# Patient Record
Sex: FEMALE | Race: WHITE | Employment: UNEMPLOYED | URBAN - METROPOLITAN AREA
[De-identification: names, ages, dates, MRNs, and addresses within clinical notes are randomized per-mention and may not be internally consistent; named-entity substitution may affect disease eponyms.]

---

## 2018-04-07 ENCOUNTER — HOSPITAL ENCOUNTER (EMERGENCY)
Facility: CLINIC | Age: 68
Discharge: HOME | End: 2018-04-08
Attending: EMERGENCY MEDICINE | Admitting: EMERGENCY MEDICINE
Payer: COMMERCIAL

## 2018-04-07 DIAGNOSIS — S00.83XA CONTUSION OF FACE, INITIAL ENCOUNTER: ICD-10-CM

## 2018-04-07 DIAGNOSIS — F10.920 ALCOHOLIC INTOXICATION WITHOUT COMPLICATION (H): ICD-10-CM

## 2018-04-07 PROCEDURE — 99285 EMERGENCY DEPT VISIT HI MDM: CPT

## 2018-04-07 ASSESSMENT — ENCOUNTER SYMPTOMS: COLOR CHANGE: 1

## 2018-04-07 NOTE — ED NOTES
Patient was found wondering around in [place] trying to flag down motorists. A  stopped to help her and patient was found to be intoxicated and unable to care for herself. She was placed on a  hold.

## 2018-04-07 NOTE — ED AVS SNAPSHOT
Emergency Department    [address] [facility] [address]    Phone:  [phone]    Fax:  [phone]                                       La Forrest   MRN: [id number]    Department:   Emergency Department   Date of Visit:  4/7/2018           Patient Information     Date Of Birth          1950        Your diagnoses for this visit were:     Contusion of face, initial encounter     Alcoholic intoxication without complication (H)        You were seen by Santiago Hart MD, Cherrie Clement MD, Hilda Comer MD, and Sanchez Harper MD.      Follow-up Information     Schedule an appointment as soon as possible for a visit with [facility], [facility].    Contact information:    [address]  [address]  [place] [address] [phone]          Follow up with  Emergency Department.    Specialty:  EMERGENCY MEDICINE    Why:  As needed, If symptoms worsen    Contact information:    [address] [address] [address] [phone]        Discharge Instructions         Alcohol Intoxication  Alcohol intoxication occurs when you drink alcohol faster than your liver can remove it from your system. The following facts are important to remember:    It can take 10 minutes or more to start to feel the effects of a drink, so you can easily get more intoxicated than you intended.    One drink may be more than 1 serving of alcohol. Depending on the drink, it can be 2 to 4 servings.    It takes about an hour for your body to metabolize (clear) 1 serving. If you have more than 1 drink, it can take a couple of hours or more.    Many things affect how drinks will affect you, including whether you ve eaten, how fast you drink, your size, how much you normally drink (or not), medicines you take, chronic diseases you have, and gender.  Signs and symptoms of alcohol poisoning  The following are signs and symptoms of alcohol  "poisoning:  Mild impairment    Reduced inhibitions    Slurred speech    Drowsiness    Decreased fine motor skills  Moderate impairment    Erratic behavior, aggression, depression    Impaired judgment    Confusion    Concentration difficulties    Coordination problems  Severe impairment    Vomiting    Seizures    Unconsciousness    Cold, clammy    Slow or irregular breathing    Hypothermia (low body temperature)    Coma  Health effects  Alcohol abuse causes health problems. Sometimes this can happen after only drinking a  little.\" There is no set number of drinks or amount of alcohol that defines too much. The more you drink at one time, and the more frequently you drink determine both the short-term and long-term health effects. It affects all parts of your body and your health, including your:    Brain. Alcohol is a central nervous system depressant. It can damage parts of the brain that affect your balance, memory, thinking, and emotions. It can cause memory loss, blackouts, depression, agitation, sleep cycle changes, and seizures. These changes may or may not be reversible.    Heart and vascular system. Alcohol affects multiple areas. It can damage heart muscle causing cardiomyopathy, which is a weakening and stretching of the heart muscle. This can lead to trouble breathing, an irregular heartbeat, atrial fibrillation, leg swelling, and heart failure. It makes the blood vessels stiffen causing hypertension (high blood pressure). All of these problems increase your risk of having heart attacks or strokes.    Liver. Alcohol causes fat to build up in the liver, affecting its normal function. This increases the risk for hepatitis, leading to abdominal pain, appetite loss, jaundice, bleeding problems, liver fibrosis, and cirrhosis. This in turn can affect your ability to fight off infections, and can cause diabetes. The liver changes prevent it from removing toxins in your blood that can cause encephalopathy. Signs " of this are confusion, altered level of consciousness, personality changes, memory loss, seizures, coma, and death.    Pancreas. Alcohol can cause inflammation of the pancreas, or pancreatitis. This can cause pain in your abdomen, fever, and diabetes.    Immune system. Alcohol weakens your immune system in a number of ways. It suppresses your immune system making it harder to fight off infections and colds. You will also have a higher risk of certain infections like pneumonia and tuberculosis.    Cancer risk. Alcohol raises your risk of cancer of the mouth, esophagus, pharynx, larynx, liver, and breast.    Sexual function. Alcohol abuse can also lead to sexual problems.  Alcohol use during pregnancy may cause permanent damage to the growing baby.  Home care  The following guidelines will help you care for yourself at home:    Don't drink any more alcohol.    Don't drive until all effects of the alcohol have worn off.    Don't operate machinery that can cause injuries.    Get lots of rest over the next few days. Drink plenty of water and other non-alcoholic liquids. Try to eat regular meals.    If you have been drinking heavily on a daily basis, you may go through alcohol withdrawal. The usual symptoms last 3 to 4 days and may include nervousness, shakiness, nausea, sweating, sleeplessness, and can even cause seizures and a serious withdrawal symptom called delirium tremens, or DTs. During this time, it is best that you stay with family or friends who can help and support you. You can also admit yourself to a residential detox program. If your symptoms are severe (seizures, severe shakiness, confusion), contact your doctor or call an ambulance for help (see below).   Follow-up care  If alcohol is a problem in your life, these are some organizations that can help you:    Alcoholics Anonymous offers support through a self-help fellowship. There are no dues or fees. See the Yellow Pages and call for time and place of  "meetings. Find AA online at www.aa.org.    [employer] offers support to families of alcohol users. Contact 719-515-9249, or online at www.al-anon.org.    National [place] on Alcoholism and Drug Dependence can be reached at 703-790-8281, or online at www.ncadd.org.    There are also inpatient and residential alcohol detox programs. Check the Internet or phonebook Yellow Pages under  Drug Abuse and Treatment Centers.   Call 911  Call 911 if any of these occur:    Trouble breathing or slow irregular breathing    Chest pain    Sudden weakness on one side of your body or sudden trouble speaking    Heavy bleeding or vomiting blood    Very drowsy or trouble awakening    Fainting or loss of consciousness    Rapid heart rate    Seizure  When to seek medical advice  Call your healthcare provider right away if any of these occur:    Severe shakiness     Fever over 100.4  F (38.0  C)    Confusion or hallucinations (seeing, hearing, or feeling things that are not there)    Pain in your upper abdomen that gets worse    Repeated vomiting  Date Last Reviewed: 6/1/2016 2000-2017 [employer]. [address] [place] [address]. All rights reserved. This information is not intended as a substitute for professional medical care. Always follow your healthcare professional's instructions.        Addiction: Getting Help     \"It was hard to admit that I had a problem. But when I did, I had to get help.\"   Admitting that you have a substance abuse problem isn t easy. It takes courage and honesty to admit you re abusing alcohol or other drugs. But once you re ready to look at your use, you ve taken a big step toward overcoming the problem. When you face your problem, you also accept that you re accountable for your actions and for changing them. There are many programs and people who can help you overcome your problem. And remember, it s OK to get help. It s also the first step to getting your life back " together.  Getting help and support  Recovery doesn t happen overnight. It s a lifelong process with many steps along the way. During those steps, you ll work on changing the things that were part of your substance abuse. A counselor or other health care provider can give you support. So can a , , or rabbi who is trained in substance abuse counseling. Friends and family may also help once you are connected with professionals. Together you can decide on lifestyle changes necessary to success, enabling you to have a positive and rewarding life.  Date Last Reviewed: 2/1/2017 2000-2017 [employer] [address] [place] [address]. All rights reserved. This information is not intended as a substitute for professional medical care. Always follow your healthcare professional's instructions.          24 Hour Appointment Hotline       To make an appointment at any [facility], call [phone] ([phone]). If you don't have a family doctor or clinic, we will help you find one. [facility] are conveniently located to serve the needs of you and your family.             Review of your medicines      Notice     You have not been prescribed any medications.            Procedures and tests performed during your visit     May feed patient      Orders Needing Specimen Collection     None      Pending Results     No orders found for last 3 day(s).            Pending Culture Results     No orders found for last 3 day(s).            Pending Results Instructions     If you had any lab results that were not finalized at the time of your Discharge, you can call the ED Lab Result RN at [phone]. You will be contacted by this team for any positive Lab results or changes in treatment. The nurses are available 7 days a week from 10A to 6:30P.  You can leave a message 24 hours per day and they will return your call.        Test Results From Your Hospital Stay               Clinical  Quality Measure: Blood Pressure Screening     Your blood pressure was checked while you were in the emergency department today. The last reading we obtained was  BP: (!) 155/101 . Please read the guidelines below about what these numbers mean and what you should do about them.  If your systolic blood pressure (the top number) is less than 120 and your diastolic blood pressure (the bottom number) is less than 80, then your blood pressure is normal. There is nothing more that you need to do about it.  If your systolic blood pressure (the top number) is 120-139 or your diastolic blood pressure (the bottom number) is 80-89, your blood pressure may be higher than it should be. You should have your blood pressure rechecked within a year by a primary care provider.  If your systolic blood pressure (the top number) is 140 or greater or your diastolic blood pressure (the bottom number) is 90 or greater, you may have high blood pressure. High blood pressure is treatable, but if left untreated over time it can put you at risk for heart attack, stroke, or kidney failure. You should have your blood pressure rechecked by a primary care provider within the next 4 weeks.  If your provider in the emergency department today gave you specific instructions to follow-up with your doctor or provider even sooner than that, you should follow that instruction and not wait for up to 4 weeks for your follow-up visit.        Thank you for choosing [facility]       Thank you for choosing [facility] for your care. Our goal is always to provide you with excellent care. Hearing back from our patients is one way we can continue to improve our services. Please take a few minutes to complete the written survey that you may receive in the mail after you visit with us. Thank you!        [employer] Information     [facility] lets you send messages to your doctor, view your test results, renew your prescriptions, schedule appointments and more. To sign up, go to  [email or web address] . Click on \"Log in\" on the left side of the screen, which will take you to the Welcome page. Then click on \"Sign up Now\" on the right side of the page.     You will be asked to enter the access code listed below, as well as some personal information. Please follow the directions to create your username and password.     Your access code is: [email or web address]  Expires: 2018  9:51 AM     Your access code will  in 90 days. If you need help or a new code, please call your [facility] clinic or [phone].        Care EveryWhere ID     This is your Care EveryWhere ID. This could be used by other organizations to access your [facility] medical records  [id number]        Equal Access to Services     NEHAL FOUNTAIN : Gina Jenkins, allen foreman, madhavi delaney, geeta gonzalez . So [facility] [phone].    ATENCIÓN: Si habla español, tiene a kelley disposición servicios gratuitos de asistencia lingüística. Llame al [phone].    We comply with applicable federal civil rights laws and [place] laws. We do not discriminate on the basis of race, color, national origin, age, disability, sex, sexual orientation, or gender identity.            After Visit Summary       This is your record. Keep this with you and show to your community pharmacist(s) and doctor(s) at your next visit.                  "

## 2018-04-07 NOTE — ED PROVIDER NOTES
"  History     Chief Complaint:  Alcohol abuse     HPI   La Forrest is a 67 year old female with history of alcohol abuse who presents with  for evaluation of alcohol intoxication. Per nursing report, the patient was found by the police wandering around [place] and was picked up, placed on  hold. Breathalyzer measurement was 0.25. Here, the patient is laying on the gurney there is a large black and blue bruise to her right eye. The patient admits to drinking today with her significant other, Rashid, today in their apartment. She states \"Don told me to leave.\" She is unsure how to get the bruise but states \"Don didn't do this.\" She is able to ambulate and denies any additional injuries. Per Care Everywhere, the patient has had multiple visits to [facility] for alcohol intoxication and has not been discharged to detox recently.     Allergies:  No known drug allergies     Medications:    Ibuprofen   Norco      Past Medical History:    Alcohol abuse  Homeless   Suicidal behavior  Subarachnoid hemorrhage    Past Surgical History:    History reviewed. No pertinent surgical history.    Family History:    History reviewed. No pertinent family history.      Social History:  Smoking status: Everyday smoker  Alcohol use: Yes   Marital Status:        Review of Systems   Skin: Positive for color change (right eye).   Psychiatric/Behavioral: Positive for behavioral problems.   All other systems reviewed and are negative.    Physical Exam   First Vitals:  Patient Vitals for the past 24 hrs:   BP Temp Temp src Pulse Heart Rate Resp SpO2 Height Weight   04/07/18 1732 125/79 98.9  F (37.2  C) Oral 77 77 16 99 % 1.651 m (5' 5\") 54.4 kg (120 lb)        Physical Exam  General: Resting comfortably on the gurney, initially difficult with the nurses and police   officer but quite content and pleasant during my interview   Head:  There is a slightly old a appearing right periorbital contusions noted "     There is bruising to eyebrow of bother eyelid and right infraorbital region     The underlying bones are non tender     There this no diplopia   Eyes:  The pupils are equal, round, and reactive to light    There is mild horizontal nystagmus     Extraocular muscles are intact    Conjunctivae and sclerae are normal  ENT:    The nose is normal    Pinnae are normal    The oropharynx is normal    Uvula is in the midline  Neck:  Normal range of motion    There is no rigidity noted    There is no midline cervical spine pain/tenderness    Trachea is in the midline    No mass is detected  CV:  Regular rate and underlying rhythm     Normal S1/S2, no S3/S4    No pathological murmur detected  Resp:  Lungs are clear    There is no tachypnea    Non-labored    No rales    No wheezing   GI:  Abdomen is soft, there is no rigidity    No distension    No tympani    No rebound tenderness     Non-surgical without peritoneal features  MS:  Normal muscular tone    Symmetric motor strength    No major joint effusions    No asymmetric leg swelling, no calf tenderness  Skin:  No rash or acute skin lesions noted  Neuro: Speech is normal and fluent    The patient is mildly intoxicated     She admits to drinking alcohol today   Psych:  Awake. Alert to birthday and address.     Normal affect.  Appropriate interactions.  Lymph: No anterior cervical lymphadenopathy noted    Emergency Department Course     Emergency Department Course:  Past medical records, nursing notes, and vitals reviewed.  1726: I performed an exam of the patient and obtained history, as documented above.   Patient had a normal adult meal here in the ED.     2100 hrs: I rechecked the patient and ask if she would like us to find a female detox bed, she noted yes.  Our staff is calling around but many of the units are at capacity        Impression & Plan    Medical Decision Making:  This patient presents with alcohol intoxication.  She was found walking around in [place]  and did have some bruising around her right eye which prompted the police to bring her here for an evaluation.  The bruising to the right forehead and periocular area appears somewhat old.  There is no bony tenderness underlying the ecchymosis.  The eye itself appears normal.  The patient does have alcohol intoxication.  She was intermittently difficult with the nurses, mainly wanting to be left alone.  We did offer the patient detox.  A female detox bed is being searched for, but most centers are at capacity at this time.  She does not have a ride home at this juncture.  We are still waiting for the status at [facility] to see if they have a bed.  Patient will be signed out to Dr. Clement for final disposition.  If there are no detox beds available, the patient can be discharged when clinically sober.     Diagnosis:    ICD-10-CM    1. Contusion of face, initial encounter S00.83XA    2. Alcoholic intoxication without complication (H) F10.920        Disposition:  Pending at this time.        EMERGENCY DEPARTMENT    MARLENE, Olaf Hunter, am serving as a scribe at 5:37 PM on 4/7/2018 to document services personally performed by Santiago Hart MD based on my observations and the provider's statements to me.       Santiago Hart MD  04/08/18 2025

## 2018-04-07 NOTE — ED AVS SNAPSHOT
Emergency Department    [address] [facility] [address]    Phone:  [phone]    Fax:  [phone]                                       La Forrest   MRN: [id number]    Department:   Emergency Department   Date of Visit:  4/7/2018           After Visit Summary Signature Page     I have received my discharge instructions, and my questions have been answered. I have discussed any challenges I see with this plan with the nurse or doctor.    ..........................................................................................................................................  Patient/Patient Representative Signature      ..........................................................................................................................................  Patient Representative Print Name and Relationship to Patient    ..................................................               ................................................  Date                                            Time    ..........................................................................................................................................  Reviewed by Signature/Title    ...................................................              ..............................................  Date                                                            Time

## 2018-04-07 NOTE — ED NOTES
Pt refused to go into  room. Informed by security she could sit in common area. Pt declined offer of blanket and updated on wait time for her food.

## 2018-04-07 NOTE — ED NOTES
"Report received. Assumed care of pt. Pt seen by Dr. Hart. Per Dr. Hart pt is to be given food to eat. Pt became verbally aggressive and beligerent when asked to change into scrubs. The presence of 2 RN's and 2 security guards needed to insist pt to change into scrubs which she reluctantly did after swearing and stating things like \"you better get the fuck away from me because I'm not in the mood\". Pt will be moved to the  area due to verbal aggression and being uncooperative.  "

## 2018-04-08 VITALS
TEMPERATURE: 98.9 F | HEART RATE: 77 BPM | DIASTOLIC BLOOD PRESSURE: 98 MMHG | BODY MASS INDEX: 19.99 KG/M2 | WEIGHT: 120 LBS | OXYGEN SATURATION: 94 % | RESPIRATION RATE: 16 BRPM | SYSTOLIC BLOOD PRESSURE: 148 MMHG | HEIGHT: 65 IN

## 2018-04-08 PROCEDURE — 25000132 ZZH RX MED GY IP 250 OP 250 PS 637: Performed by: EMERGENCY MEDICINE

## 2018-04-08 RX ORDER — LORAZEPAM 1 MG/1
1 TABLET ORAL ONCE
Status: COMPLETED | OUTPATIENT
Start: 2018-04-08 | End: 2018-04-08

## 2018-04-08 RX ADMIN — LORAZEPAM 1 MG: 1 TABLET ORAL at 08:42

## 2018-04-08 NOTE — ED NOTES
Report received. Assumed care of pt. Pt previously on  Hold with security present and will remain on that while in the ED. Belongings done by previous staff.

## 2018-04-08 NOTE — ED NOTES
Assumed care at this time. Report received from Dinorah NÚÑEZ RN.  Pt sleeping at this time. Will continue to monitor.    Plan per MD: Pt to be offered Detox; or Sober ride;  Marlen Soliman RN,.......................................... 4/7/2018   7:18 PM

## 2018-04-08 NOTE — ED NOTES
"Pt states she doesn't want to go to detox. Told pt we could either give her bus tokens to go somewhere during the day and phone numbers to shelters for the night. Pt refused the shelter numbers and states she would go to [place] where she \"knows some people\" and could stay at a place called \"[facility] where she can drink coffee during the day. Pt declined offer of help for her drinking.  "

## 2018-04-08 NOTE — ED NOTES
MD in to speak with pt.  Per MD pt is willing to go to detox. Will attempt to call detox facilities for available beds.  Marlen Soliman RN,.......................................... 4/7/2018   8:55 PM

## 2018-04-08 NOTE — ED NOTES
Pt signed D/C instructions but then refused to take the paperwork with shelter resources with her. Pt getting dressed.

## 2018-04-08 NOTE — ED PROVIDER NOTES
I assumed care of the patient at 7 AM from Dr. Comer.  Upon my face-to-face examination at 7:45 AM the patient is clinically sober and states she feels mildly anxious.  She received 1 mg of Ativan orally.  I discussed several treatment options with the patient including transfer to a detox facility or transfer to a crisis shelter as the patient relays that she has no place to go.  The patient declines both of these options and has become increasingly adversarial with the healthcare staff.  She received breakfast and at this point is clinically sober with no signs of significant alcohol withdrawal or delirium tremens.  She has repetitively refused any assistance in seeking treatment for her alcohol use and states that she will go to [facility] where she knows people.  We have advised her to seek treatment for her alcohol use and have provided a list of crisis shelters for assistance.  The patient was provided with bus tokens and discharged at her request.     Sanchez Harper MD  04/08/18 0969

## 2018-04-08 NOTE — DISCHARGE INSTRUCTIONS
"  Alcohol Intoxication  Alcohol intoxication occurs when you drink alcohol faster than your liver can remove it from your system. The following facts are important to remember:    It can take 10 minutes or more to start to feel the effects of a drink, so you can easily get more intoxicated than you intended.    One drink may be more than 1 serving of alcohol. Depending on the drink, it can be 2 to 4 servings.    It takes about an hour for your body to metabolize (clear) 1 serving. If you have more than 1 drink, it can take a couple of hours or more.    Many things affect how drinks will affect you, including whether you ve eaten, how fast you drink, your size, how much you normally drink (or not), medicines you take, chronic diseases you have, and gender.  Signs and symptoms of alcohol poisoning  The following are signs and symptoms of alcohol poisoning:  Mild impairment    Reduced inhibitions    Slurred speech    Drowsiness    Decreased fine motor skills  Moderate impairment    Erratic behavior, aggression, depression    Impaired judgment    Confusion    Concentration difficulties    Coordination problems  Severe impairment    Vomiting    Seizures    Unconsciousness    Cold, clammy    Slow or irregular breathing    Hypothermia (low body temperature)    Coma  Health effects  Alcohol abuse causes health problems. Sometimes this can happen after only drinking a  little.\" There is no set number of drinks or amount of alcohol that defines too much. The more you drink at one time, and the more frequently you drink determine both the short-term and long-term health effects. It affects all parts of your body and your health, including your:    Brain. Alcohol is a central nervous system depressant. It can damage parts of the brain that affect your balance, memory, thinking, and emotions. It can cause memory loss, blackouts, depression, agitation, sleep cycle changes, and seizures. These changes may or may not be " reversible.    Heart and vascular system. Alcohol affects multiple areas. It can damage heart muscle causing cardiomyopathy, which is a weakening and stretching of the heart muscle. This can lead to trouble breathing, an irregular heartbeat, atrial fibrillation, leg swelling, and heart failure. It makes the blood vessels stiffen causing hypertension (high blood pressure). All of these problems increase your risk of having heart attacks or strokes.    Liver. Alcohol causes fat to build up in the liver, affecting its normal function. This increases the risk for hepatitis, leading to abdominal pain, appetite loss, jaundice, bleeding problems, liver fibrosis, and cirrhosis. This in turn can affect your ability to fight off infections, and can cause diabetes. The liver changes prevent it from removing toxins in your blood that can cause encephalopathy. Signs of this are confusion, altered level of consciousness, personality changes, memory loss, seizures, coma, and death.    Pancreas. Alcohol can cause inflammation of the pancreas, or pancreatitis. This can cause pain in your abdomen, fever, and diabetes.    Immune system. Alcohol weakens your immune system in a number of ways. It suppresses your immune system making it harder to fight off infections and colds. You will also have a higher risk of certain infections like pneumonia and tuberculosis.    Cancer risk. Alcohol raises your risk of cancer of the mouth, esophagus, pharynx, larynx, liver, and breast.    Sexual function. Alcohol abuse can also lead to sexual problems.  Alcohol use during pregnancy may cause permanent damage to the growing baby.  Home care  The following guidelines will help you care for yourself at home:    Don't drink any more alcohol.    Don't drive until all effects of the alcohol have worn off.    Don't operate machinery that can cause injuries.    Get lots of rest over the next few days. Drink plenty of water and other non-alcoholic liquids.  Try to eat regular meals.    If you have been drinking heavily on a daily basis, you may go through alcohol withdrawal. The usual symptoms last 3 to 4 days and may include nervousness, shakiness, nausea, sweating, sleeplessness, and can even cause seizures and a serious withdrawal symptom called delirium tremens, or DTs. During this time, it is best that you stay with family or friends who can help and support you. You can also admit yourself to a residential detox program. If your symptoms are severe (seizures, severe shakiness, confusion), contact your doctor or call an ambulance for help (see below).   Follow-up care  If alcohol is a problem in your life, these are some organizations that can help you:    Alcoholics Anonymous offers support through a self-help fellowship. There are no dues or fees. See the Yellow Pages and call for time and place of meetings. Find AA online at www.aa.org.    [employer] offers support to families of alcohol users. Contact 011-714-1359, or online at www.al-anoace.org.    National [place] on Alcoholism and Drug Dependence can be reached at 168-132-8147, or online at www.ncadd.org.    There are also inpatient and residential alcohol detox programs. Check the Internet or phonebook Yellow Pages under  Drug Abuse and Treatment Centers.   Call 911  Call 911 if any of these occur:    Trouble breathing or slow irregular breathing    Chest pain    Sudden weakness on one side of your body or sudden trouble speaking    Heavy bleeding or vomiting blood    Very drowsy or trouble awakening    Fainting or loss of consciousness    Rapid heart rate    Seizure  When to seek medical advice  Call your healthcare provider right away if any of these occur:    Severe shakiness     Fever over 100.4  F (38.0  C)    Confusion or hallucinations (seeing, hearing, or feeling things that are not there)    Pain in your upper abdomen that gets worse    Repeated vomiting  Date Last Reviewed: 6/1/2016 2000-2017 The  "[employer]. [address] [place] [address]. All rights reserved. This information is not intended as a substitute for professional medical care. Always follow your healthcare professional's instructions.        Addiction: Getting Help     \"It was hard to admit that I had a problem. But when I did, I had to get help.\"   Admitting that you have a substance abuse problem isn t easy. It takes courage and honesty to admit you re abusing alcohol or other drugs. But once you re ready to look at your use, you ve taken a big step toward overcoming the problem. When you face your problem, you also accept that you re accountable for your actions and for changing them. There are many programs and people who can help you overcome your problem. And remember, it s OK to get help. It s also the first step to getting your life back together.  Getting help and support  Recovery doesn t happen overnight. It s a lifelong process with many steps along the way. During those steps, you ll work on changing the things that were part of your substance abuse. A counselor or other health care provider can give you support. So can a , , or rabbi who is trained in substance abuse counseling. Friends and family may also help once you are connected with professionals. Together you can decide on lifestyle changes necessary to success, enabling you to have a positive and rewarding life.  Date Last Reviewed: 2/1/2017 2000-2017 [employer]. [address] [place] [address]. All rights reserved. This information is not intended as a substitute for professional medical care. Always follow your healthcare professional's instructions.        "

## 2018-04-08 NOTE — ED NOTES
Pt given 2 bus tokens and EDT showed pt where hospital bus stop is. Offered D/C instructions again but pt refused to take them.

## 2018-04-08 NOTE — ED NOTES
Alcohol Breath test on scene at 1727 = 0.25.  Marlen Soliman RN,.......................................... 4/7/2018   7:25 PM
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
## 2018-04-08 NOTE — ED NOTES
Called [address], [facility], and [facility] to inquire if there are any detox beds available. All detox facilities at capacity at this time.  Waiting to hear back from [place].  Marlen Soliman RN,.......................................... 4/7/2018   9:00 PM

## 2019-01-19 ENCOUNTER — APPOINTMENT (OUTPATIENT)
Dept: GENERAL RADIOLOGY | Facility: CLINIC | Age: 69
End: 2019-01-19
Payer: MEDICARE

## 2019-01-19 ENCOUNTER — HOSPITAL ENCOUNTER (EMERGENCY)
Facility: CLINIC | Age: 69
Discharge: HOME | End: 2019-01-19
Attending: EMERGENCY MEDICINE | Admitting: EMERGENCY MEDICINE
Payer: MEDICARE

## 2019-01-19 VITALS
TEMPERATURE: 98 F | SYSTOLIC BLOOD PRESSURE: 124 MMHG | DIASTOLIC BLOOD PRESSURE: 71 MMHG | HEIGHT: 65 IN | WEIGHT: 120 LBS | HEART RATE: 74 BPM | RESPIRATION RATE: 19 BRPM | BODY MASS INDEX: 19.99 KG/M2 | OXYGEN SATURATION: 96 %

## 2019-01-19 DIAGNOSIS — F10.929 ALCOHOL INTOXICATION, WITH UNSPECIFIED COMPLICATION (H): ICD-10-CM

## 2019-01-19 DIAGNOSIS — Z59.00 HOMELESS: ICD-10-CM

## 2019-01-19 DIAGNOSIS — R06.00 DYSPNEA, UNSPECIFIED TYPE: ICD-10-CM

## 2019-01-19 DIAGNOSIS — Z87.09 HISTORY OF COPD: ICD-10-CM

## 2019-01-19 DIAGNOSIS — S09.90XA INJURY OF HEAD, INITIAL ENCOUNTER: ICD-10-CM

## 2019-01-19 LAB
ALBUMIN SERPL-MCNC: 3.2 G/DL (ref 3.4–5)
ALCOHOL BREATH TEST: 0.05 (ref 0–0.01)
ALP SERPL-CCNC: 109 U/L (ref 40–150)
ALT SERPL W P-5'-P-CCNC: 38 U/L (ref 0–50)
ANION GAP SERPL CALCULATED.3IONS-SCNC: 10 MMOL/L (ref 3–14)
AST SERPL W P-5'-P-CCNC: 48 U/L (ref 0–45)
BILIRUB SERPL-MCNC: 0.3 MG/DL (ref 0.2–1.3)
BUN SERPL-MCNC: 10 MG/DL (ref 7–30)
CALCIUM SERPL-MCNC: 8 MG/DL (ref 8.5–10.1)
CHLORIDE SERPL-SCNC: 110 MMOL/L (ref 94–109)
CO2 SERPL-SCNC: 25 MMOL/L (ref 20–32)
CREAT SERPL-MCNC: 0.64 MG/DL (ref 0.52–1.04)
ETHANOL SERPL-MCNC: 0.23 G/DL
GFR SERPL CREATININE-BSD FRML MDRD: >90 ML/MIN/{1.73_M2}
GLUCOSE SERPL-MCNC: 100 MG/DL (ref 70–99)
INTERPRETATION ECG - MUSE: NORMAL
POTASSIUM SERPL-SCNC: 3.4 MMOL/L (ref 3.4–5.3)
PROT SERPL-MCNC: 7.1 G/DL (ref 6.8–8.8)
SODIUM SERPL-SCNC: 145 MMOL/L (ref 133–144)

## 2019-01-19 PROCEDURE — 93005 ELECTROCARDIOGRAM TRACING: CPT

## 2019-01-19 PROCEDURE — 80053 COMPREHEN METABOLIC PANEL: CPT | Performed by: EMERGENCY MEDICINE

## 2019-01-19 PROCEDURE — 99285 EMERGENCY DEPT VISIT HI MDM: CPT | Mod: 25

## 2019-01-19 PROCEDURE — 82075 ASSAY OF BREATH ETHANOL: CPT

## 2019-01-19 PROCEDURE — 71046 X-RAY EXAM CHEST 2 VIEWS: CPT

## 2019-01-19 PROCEDURE — 80320 DRUG SCREEN QUANTALCOHOLS: CPT | Performed by: EMERGENCY MEDICINE

## 2019-01-19 SDOH — ECONOMIC STABILITY - HOUSING INSECURITY: HOMELESSNESS UNSPECIFIED: Z59.00

## 2019-01-19 ASSESSMENT — MIFFLIN-ST. JEOR: SCORE: 1085.2

## 2019-01-19 NOTE — ED AVS SNAPSHOT
Emergency Department  [address] [facility] [address]  Phone:  [phone]  Fax:  [phone]                                    La Forrest   MRN: [id number]    Department:   Emergency Department   Date of Visit:  1/19/2019           After Visit Summary Signature Page    I have received my discharge instructions, and my questions have been answered. I have discussed any challenges I see with this plan with the nurse or doctor.    ..........................................................................................................................................  Patient/Patient Representative Signature      ..........................................................................................................................................  Patient Representative Print Name and Relationship to Patient    ..................................................               ................................................  Date                                   Time    ..........................................................................................................................................  Reviewed by Signature/Title    ...................................................              ..............................................  Date                                               Time          22EPIC Rev 08/18

## 2019-01-19 NOTE — ED NOTES
Bed: ED21  Expected date:   Expected time:   Means of arrival:   Comments:  533 68F SOB COPD, ETOH in 10 min
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
## 2019-01-20 ENCOUNTER — HOSPITAL ENCOUNTER (EMERGENCY)
Facility: CLINIC | Age: 69
Discharge: HOME | End: 2019-01-21
Attending: EMERGENCY MEDICINE | Admitting: EMERGENCY MEDICINE
Payer: MEDICARE

## 2019-01-20 DIAGNOSIS — J44.1 COPD EXACERBATION (H): ICD-10-CM

## 2019-01-20 PROCEDURE — 80048 BASIC METABOLIC PNL TOTAL CA: CPT | Performed by: EMERGENCY MEDICINE

## 2019-01-20 PROCEDURE — 93005 ELECTROCARDIOGRAM TRACING: CPT

## 2019-01-20 PROCEDURE — 85025 COMPLETE CBC W/AUTO DIFF WBC: CPT | Performed by: EMERGENCY MEDICINE

## 2019-01-20 PROCEDURE — 94640 AIRWAY INHALATION TREATMENT: CPT

## 2019-01-20 PROCEDURE — 84484 ASSAY OF TROPONIN QUANT: CPT | Performed by: EMERGENCY MEDICINE

## 2019-01-20 PROCEDURE — 99285 EMERGENCY DEPT VISIT HI MDM: CPT | Mod: 25

## 2019-01-20 NOTE — ED AVS SNAPSHOT
Emergency Department  [address] [facility] [address]  Phone:  [phone]  Fax:  [phone]                                    La Forrest   MRN: [id number]    Department:   Emergency Department   Date of Visit:  1/20/2019           After Visit Summary Signature Page    I have received my discharge instructions, and my questions have been answered. I have discussed any challenges I see with this plan with the nurse or doctor.    ..........................................................................................................................................  Patient/Patient Representative Signature      ..........................................................................................................................................  Patient Representative Print Name and Relationship to Patient    ..................................................               ................................................  Date                                   Time    ..........................................................................................................................................  Reviewed by Signature/Title    ...................................................              ..............................................  Date                                               Time          22EPIC Rev 08/18

## 2019-01-21 ENCOUNTER — APPOINTMENT (OUTPATIENT)
Dept: GENERAL RADIOLOGY | Facility: CLINIC | Age: 69
End: 2019-01-21
Payer: MEDICARE

## 2019-01-21 VITALS
WEIGHT: 110 LBS | TEMPERATURE: 97.7 F | DIASTOLIC BLOOD PRESSURE: 101 MMHG | OXYGEN SATURATION: 97 % | RESPIRATION RATE: 16 BRPM | HEIGHT: 65 IN | SYSTOLIC BLOOD PRESSURE: 152 MMHG | BODY MASS INDEX: 18.33 KG/M2

## 2019-01-21 LAB
ANION GAP SERPL CALCULATED.3IONS-SCNC: 9 MMOL/L (ref 3–14)
BASE EXCESS BLDV CALC-SCNC: 2.5 MMOL/L
BASOPHILS # BLD AUTO: 0 10E9/L (ref 0–0.2)
BASOPHILS NFR BLD AUTO: 0.1 %
BUN SERPL-MCNC: 8 MG/DL (ref 7–30)
CALCIUM SERPL-MCNC: 7.7 MG/DL (ref 8.5–10.1)
CHLORIDE SERPL-SCNC: 109 MMOL/L (ref 94–109)
CO2 SERPL-SCNC: 26 MMOL/L (ref 20–32)
CREAT SERPL-MCNC: 0.54 MG/DL (ref 0.52–1.04)
DIFFERENTIAL METHOD BLD: ABNORMAL
EOSINOPHIL # BLD AUTO: 0 10E9/L (ref 0–0.7)
EOSINOPHIL NFR BLD AUTO: 0.4 %
ERYTHROCYTE [DISTWIDTH] IN BLOOD BY AUTOMATED COUNT: 16.9 % (ref 10–15)
GFR SERPL CREATININE-BSD FRML MDRD: >90 ML/MIN/{1.73_M2}
GLUCOSE SERPL-MCNC: 86 MG/DL (ref 70–99)
HCO3 BLDV-SCNC: 28 MMOL/L (ref 21–28)
HCT VFR BLD AUTO: 35.3 % (ref 35–47)
HGB BLD-MCNC: 12 G/DL (ref 11.7–15.7)
IMM GRANULOCYTES # BLD: 0 10E9/L (ref 0–0.4)
IMM GRANULOCYTES NFR BLD: 0.2 %
INTERPRETATION ECG - MUSE: NORMAL
LYMPHOCYTES # BLD AUTO: 2.7 10E9/L (ref 0.8–5.3)
LYMPHOCYTES NFR BLD AUTO: 32.3 %
MCH RBC QN AUTO: 31.9 PG (ref 26.5–33)
MCHC RBC AUTO-ENTMCNC: 34 G/DL (ref 31.5–36.5)
MCV RBC AUTO: 94 FL (ref 78–100)
MONOCYTES # BLD AUTO: 0.5 10E9/L (ref 0–1.3)
MONOCYTES NFR BLD AUTO: 6 %
NEUTROPHILS # BLD AUTO: 5.1 10E9/L (ref 1.6–8.3)
NEUTROPHILS NFR BLD AUTO: 61 %
NRBC # BLD AUTO: 0 10*3/UL
NRBC BLD AUTO-RTO: 0 /100
OXYHGB MFR BLDV: 90 %
PCO2 BLDV: 45 MM HG (ref 40–50)
PH BLDV: 7.4 PH (ref 7.32–7.43)
PLATELET # BLD AUTO: 268 10E9/L (ref 150–450)
PO2 BLDV: 73 MM HG (ref 25–47)
POTASSIUM SERPL-SCNC: 3.4 MMOL/L (ref 3.4–5.3)
RBC # BLD AUTO: 3.76 10E12/L (ref 3.8–5.2)
SODIUM SERPL-SCNC: 144 MMOL/L (ref 133–144)
TROPONIN I SERPL-MCNC: <0.015 UG/L (ref 0–0.04)
WBC # BLD AUTO: 8.3 10E9/L (ref 4–11)

## 2019-01-21 PROCEDURE — 82805 BLOOD GASES W/O2 SATURATION: CPT | Performed by: EMERGENCY MEDICINE

## 2019-01-21 PROCEDURE — 71046 X-RAY EXAM CHEST 2 VIEWS: CPT

## 2019-01-21 PROCEDURE — 25000125 ZZHC RX 250: Performed by: EMERGENCY MEDICINE

## 2019-01-21 RX ORDER — IPRATROPIUM BROMIDE AND ALBUTEROL SULFATE 2.5; .5 MG/3ML; MG/3ML
3 SOLUTION RESPIRATORY (INHALATION) ONCE
Status: COMPLETED | OUTPATIENT
Start: 2019-01-21 | End: 2019-01-21

## 2019-01-21 RX ORDER — DEXAMETHASONE SODIUM PHOSPHATE 10 MG/ML
10 INJECTION, SOLUTION INTRAMUSCULAR; INTRAVENOUS ONCE
Status: COMPLETED | OUTPATIENT
Start: 2019-01-21 | End: 2019-01-21

## 2019-01-21 RX ADMIN — IPRATROPIUM BROMIDE AND ALBUTEROL SULFATE 3 ML: .5; 2.5 SOLUTION RESPIRATORY (INHALATION) at 01:54

## 2019-01-21 RX ADMIN — DEXAMETHASONE SODIUM PHOSPHATE 10 MG: 10 INJECTION, SOLUTION INTRAMUSCULAR; INTRAVENOUS at 01:54

## 2019-01-21 ASSESSMENT — ENCOUNTER SYMPTOMS
VOMITING: 0
COUGH: 1
ABDOMINAL PAIN: 0
SHORTNESS OF BREATH: 1
FEVER: 0

## 2019-01-21 ASSESSMENT — MIFFLIN-ST. JEOR: SCORE: 1039.84
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
## 2019-01-29 ENCOUNTER — HOSPITAL ENCOUNTER (EMERGENCY)
Facility: CLINIC | Age: 69
Discharge: HOME | End: 2019-01-30
Attending: EMERGENCY MEDICINE | Admitting: EMERGENCY MEDICINE
Payer: MEDICARE

## 2019-01-29 ENCOUNTER — HOSPITAL ENCOUNTER (EMERGENCY)
Facility: CLINIC | Age: 69
Discharge: STILL ADMITTED OR ADMINISTRATIVE | End: 2019-01-29

## 2019-01-29 DIAGNOSIS — Z59.00 HOMELESSNESS: ICD-10-CM

## 2019-01-29 DIAGNOSIS — F10.920 ALCOHOLIC INTOXICATION WITHOUT COMPLICATION (H): ICD-10-CM

## 2019-01-29 LAB — GLUCOSE BLDC GLUCOMTR-MCNC: 82 MG/DL (ref 70–99)

## 2019-01-29 PROCEDURE — 99283 EMERGENCY DEPT VISIT LOW MDM: CPT

## 2019-01-29 PROCEDURE — 00000146 ZZHCL STATISTIC GLUCOSE BY METER IP

## 2019-01-29 SDOH — ECONOMIC STABILITY - HOUSING INSECURITY: HOMELESSNESS UNSPECIFIED: Z59.00

## 2019-01-29 NOTE — ED AVS SNAPSHOT
Emergency Department  [address] [facility] [address]  Phone:  [phone]  Fax:  [phone]                                    La Forrest   MRN: [id number]    Department:   Emergency Department   Date of Visit:  1/29/2019           After Visit Summary Signature Page    I have received my discharge instructions, and my questions have been answered. I have discussed any challenges I see with this plan with the nurse or doctor.    ..........................................................................................................................................  Patient/Patient Representative Signature      ..........................................................................................................................................  Patient Representative Print Name and Relationship to Patient    ..................................................               ................................................  Date                                   Time    ..........................................................................................................................................  Reviewed by Signature/Title    ...................................................              ..............................................  Date                                               Time          22EPIC Rev 08/18

## 2019-01-30 VITALS
OXYGEN SATURATION: 94 % | TEMPERATURE: 97.5 F | SYSTOLIC BLOOD PRESSURE: 132 MMHG | RESPIRATION RATE: 20 BRPM | DIASTOLIC BLOOD PRESSURE: 85 MMHG

## 2019-01-30 NOTE — ED NOTES
Called [facility] [address] [facility] on [address] [place] [address] in [place] and spoke to staff who confirmed that there is a female bed available for shelter tonight.   Marlen Soliman RN,.......................................... 1/29/2019   11:00 PM

## 2019-01-30 NOTE — ED PROVIDER NOTES
"  History     Chief Complaint:  Alcohol intoxication    HPI   The patients HPI is limited due to her altered mental status.  La Forrest is a 68 year old female with a history of frequent visits to the ED who presents with alcohol intoxication. The patient was intoxicated at [facility] at [facility] earlier today and EMS was called. The patient was taken to the ED for evaluation and treatment. Upon arrival, the patient vaguely states that she \"couldn't breath\" although no wheezing was noticed. She also states that she \"feels okay.\"  She has no other complaints.    Allergies:  The patient has no known drug allergies.    Medications:    Albuterol  Spiriva  Prednisone     Past Medical History:    chronic obstructive pulmonary disease  Alcohol abuse  emphysema    Past Surgical History:    The patient does not have any pertinent past surgical history.    Family History:    No past pertinent family history.    Social History:  Marital Status:     Smoker:   Current   Smokeless:   Unknown   Alcohol:   Yes   Drugs:   No   Lives at:   Homeless   Arrives with:   No one   Clinic:    Unknown   Work:   None      Review of Systems   Unable to perform ROS: Mental status change     Physical Exam     Patient Vitals for the past 24 hrs:   BP Temp Heart Rate Resp SpO2   01/30/19 0102 -- -- -- 20 --   01/29/19 2223 132/85 97.5  F (36.4  C) 89 -- 94 %     Physical Exam  General: Sleeping on the gurney, appears comfortable.  Well poorly groomed  Head:  The scalp, face, and head appear normal  Mouth/Throat: Mucus membranes are moist  CV:  Regular rate    Normal S1 and S2  No pathological murmur   Resp:  Breath sounds clear and equal bilaterally    Non-labored, no retractions or accessory muscle use    No coarseness    No wheezing   GI:  Abdomen is soft, no rigidity    No tenderness to palpation  MS:  No evidence of self injury, no lacerations.  No evidence of trauma.  Skin:  No lacerations  Neuro:  Speech is normal and " clear.     No apparent focal deficit.      Uses all extremities equally. Steady gate.  Psych:  Awake. Alert.  Normal affect, congruent with mood.      Appropriate interactions.    No Suicidal thoughts.    No thoughts of harming others.    Denies hallucinations, does not appear to be responding to internal stimuli.    Emergency Department Course   Laboratory:  Glucose by meter: 82     Emergency Department Course:  (7149) I performed an exam of the patient as documented above.      Work up completed. Results above.    (0120) I rechecked the patient and discussed the results of their workup thus far.      Findings and plan explained. Patient discharged home with instructions regarding supportive care, medications, and reasons to return. The importance of close follow-up was reviewed.     I personally reviewed the laboratory results with them and answered all related questions prior to discharge.    Impression & Plan    Medical Decision Making:  La Forrest is a 68 year old female who presents for evaluation of intoxication.  They are intoxicated here in ED by lab evaluation.   Patient has no known history of Delirium tremens or alcohol withdrawal seizures. There are no signs of co-ingestion including acetaminophen, drugs, medications, volatile alcohols.  There are no signs of trauma related to alcohol use and no further workup is needed including head CT.     Sober ride obtained.  Alcohol counseling provided by myself and patient does not want treatment resources.  DEC/ did not evaluate patient.      Diagnosis:    ICD-10-CM    1. Alcoholic intoxication without complication (H) F10.920    2. Homelessness Z59.0      Disposition:  Discharged    Scribe Disclosure:  I, Dipesh Yen, am serving as a scribe on 1/29/2019 at 10:43 PM to personally document services performed by Brittanie Walls MD based on my observations and the provider's statements to me.     Dipesh Yen  1/29/2019    EMERGENCY DEPARTMENT        Brittanie Walls MD  02/07/19 4097

## 2019-01-30 NOTE — DISCHARGE INSTRUCTIONS

## 2019-01-30 NOTE — ED NOTES
Called [facility]; [address] [place] [address] and On license of [facility] for availability of Male/ Female detox beds. All are currently at capacity. MD updated.  Marlen Soliman RN,.......................................... 1/29/2019   10:36 PM

## 2019-01-30 NOTE — ED NOTES
Attempted to obtain breathalyzer 3 times. Pt was unable to exhale hard enough to obtain a proper read.

## 2019-04-09 ENCOUNTER — HOSPITAL ENCOUNTER (EMERGENCY)
Facility: CLINIC | Age: 69
Discharge: HOME | End: 2019-04-09
Attending: EMERGENCY MEDICINE | Admitting: EMERGENCY MEDICINE
Payer: MEDICARE

## 2019-04-09 VITALS
SYSTOLIC BLOOD PRESSURE: 96 MMHG | TEMPERATURE: 96.4 F | OXYGEN SATURATION: 92 % | DIASTOLIC BLOOD PRESSURE: 62 MMHG | RESPIRATION RATE: 12 BRPM | HEART RATE: 87 BPM

## 2019-04-09 DIAGNOSIS — R06.00 DYSPNEA, UNSPECIFIED TYPE: ICD-10-CM

## 2019-04-09 LAB
ANION GAP SERPL CALCULATED.3IONS-SCNC: 8 MMOL/L (ref 3–14)
BASOPHILS # BLD AUTO: 0 10E9/L (ref 0–0.2)
BASOPHILS NFR BLD AUTO: 0 %
BUN SERPL-MCNC: 5 MG/DL (ref 7–30)
CALCIUM SERPL-MCNC: 7.9 MG/DL (ref 8.5–10.1)
CHLORIDE SERPL-SCNC: 108 MMOL/L (ref 94–109)
CO2 SERPL-SCNC: 29 MMOL/L (ref 20–32)
CREAT SERPL-MCNC: 0.56 MG/DL (ref 0.52–1.04)
DIFFERENTIAL METHOD BLD: ABNORMAL
EOSINOPHIL # BLD AUTO: 0 10E9/L (ref 0–0.7)
EOSINOPHIL NFR BLD AUTO: 0.5 %
ERYTHROCYTE [DISTWIDTH] IN BLOOD BY AUTOMATED COUNT: 17 % (ref 10–15)
GFR SERPL CREATININE-BSD FRML MDRD: >90 ML/MIN/{1.73_M2}
GLUCOSE SERPL-MCNC: 105 MG/DL (ref 70–99)
HCT VFR BLD AUTO: 34.1 % (ref 35–47)
HGB BLD-MCNC: 11.6 G/DL (ref 11.7–15.7)
IMM GRANULOCYTES # BLD: 0 10E9/L (ref 0–0.4)
IMM GRANULOCYTES NFR BLD: 0.2 %
INTERPRETATION ECG - MUSE: NORMAL
LYMPHOCYTES # BLD AUTO: 2.6 10E9/L (ref 0.8–5.3)
LYMPHOCYTES NFR BLD AUTO: 59.5 %
MCH RBC QN AUTO: 32.5 PG (ref 26.5–33)
MCHC RBC AUTO-ENTMCNC: 34 G/DL (ref 31.5–36.5)
MCV RBC AUTO: 96 FL (ref 78–100)
MONOCYTES # BLD AUTO: 0.6 10E9/L (ref 0–1.3)
MONOCYTES NFR BLD AUTO: 12.9 %
NEUTROPHILS # BLD AUTO: 1.2 10E9/L (ref 1.6–8.3)
NEUTROPHILS NFR BLD AUTO: 26.9 %
NRBC # BLD AUTO: 0 10*3/UL
NRBC BLD AUTO-RTO: 0 /100
NT-PROBNP SERPL-MCNC: 70 PG/ML (ref 0–900)
PLATELET # BLD AUTO: 165 10E9/L (ref 150–450)
POTASSIUM SERPL-SCNC: 3.3 MMOL/L (ref 3.4–5.3)
RBC # BLD AUTO: 3.57 10E12/L (ref 3.8–5.2)
SODIUM SERPL-SCNC: 145 MMOL/L (ref 133–144)
TROPONIN I SERPL-MCNC: <0.015 UG/L (ref 0–0.04)
WBC # BLD AUTO: 4.4 10E9/L (ref 4–11)

## 2019-04-09 PROCEDURE — 83880 ASSAY OF NATRIURETIC PEPTIDE: CPT | Performed by: EMERGENCY MEDICINE

## 2019-04-09 PROCEDURE — 99284 EMERGENCY DEPT VISIT MOD MDM: CPT | Performed by: EMERGENCY MEDICINE

## 2019-04-09 PROCEDURE — 93005 ELECTROCARDIOGRAM TRACING: CPT | Performed by: EMERGENCY MEDICINE

## 2019-04-09 PROCEDURE — 80048 BASIC METABOLIC PNL TOTAL CA: CPT | Performed by: EMERGENCY MEDICINE

## 2019-04-09 PROCEDURE — 99284 EMERGENCY DEPT VISIT MOD MDM: CPT | Mod: 25 | Performed by: EMERGENCY MEDICINE

## 2019-04-09 PROCEDURE — 93010 ELECTROCARDIOGRAM REPORT: CPT | Mod: Z6 | Performed by: EMERGENCY MEDICINE

## 2019-04-09 PROCEDURE — 84484 ASSAY OF TROPONIN QUANT: CPT | Performed by: EMERGENCY MEDICINE

## 2019-04-09 PROCEDURE — 85025 COMPLETE CBC W/AUTO DIFF WBC: CPT | Performed by: EMERGENCY MEDICINE

## 2019-04-09 ASSESSMENT — ENCOUNTER SYMPTOMS
NERVOUS/ANXIOUS: 1
SHORTNESS OF BREATH: 1
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
## 2019-04-09 NOTE — ED NOTES
At time of discharge, pt refused VS and acknowledgement of discharge. Pt did not sign discharge papers. Pt then up to BR.

## 2019-04-09 NOTE — ED AVS SNAPSHOT
[facility], [place], Emergency Department  [address]  [facility] [address]  Phone:  [phone]  Fax:  [phone]                                    La Forrest   MRN: [id number]    Department:  [facility], Emergency Department   Date of Visit:  4/9/2019           After Visit Summary Signature Page    I have received my discharge instructions, and my questions have been answered. I have discussed any challenges I see with this plan with the nurse or doctor.    ..........................................................................................................................................  Patient/Patient Representative Signature      ..........................................................................................................................................  Patient Representative Print Name and Relationship to Patient    ..................................................               ................................................  Date                                   Time    ..........................................................................................................................................  Reviewed by Signature/Title    ...................................................              ..............................................  Date                                               Time          22EPIC Rev 08/18

## 2019-04-09 NOTE — ED NOTES
Attempted to bring pt to x-ray. Pt refused. Pt sleepy.Dr. Knight notified. Pt also removed cardiac and sat monitor.
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
## 2019-04-09 NOTE — DISCHARGE INSTRUCTIONS
Please follow-up with your regular doctor in the next 2-3 days for further evaluation and follow-up care.  Please call to schedule an appointment.  Please continue your own medications.  Please return to the ER if you develop persistent high fever, worsening chest pain, shortness of breath, or any worsening of your current symptoms.  It was a pleasure taking care of you today.  We hope you feel better soon.

## 2019-05-20 ENCOUNTER — HOSPITAL ENCOUNTER (EMERGENCY)
Facility: CLINIC | Age: 69
Discharge: HOME | End: 2019-05-20
Attending: EMERGENCY MEDICINE | Admitting: EMERGENCY MEDICINE
Payer: MEDICARE

## 2019-05-20 VITALS
OXYGEN SATURATION: 98 % | HEIGHT: 61 IN | BODY MASS INDEX: 21.71 KG/M2 | DIASTOLIC BLOOD PRESSURE: 61 MMHG | HEART RATE: 89 BPM | TEMPERATURE: 97.6 F | RESPIRATION RATE: 16 BRPM | WEIGHT: 115 LBS | SYSTOLIC BLOOD PRESSURE: 101 MMHG

## 2019-05-20 DIAGNOSIS — J44.9 CHRONIC OBSTRUCTIVE PULMONARY DISEASE, UNSPECIFIED COPD TYPE (H): ICD-10-CM

## 2019-05-20 DIAGNOSIS — F10.920 ALCOHOLIC INTOXICATION WITHOUT COMPLICATION (H): ICD-10-CM

## 2019-05-20 PROCEDURE — 99285 EMERGENCY DEPT VISIT HI MDM: CPT

## 2019-05-20 RX ORDER — ALBUTEROL SULFATE 90 UG/1
2 AEROSOL, METERED RESPIRATORY (INHALATION) EVERY 4 HOURS PRN
Status: DISCONTINUED | OUTPATIENT
Start: 2019-05-20 | End: 2019-05-21 | Stop reason: HOSPADM

## 2019-05-20 ASSESSMENT — MIFFLIN-ST. JEOR: SCORE: 994.02

## 2019-05-20 ASSESSMENT — ENCOUNTER SYMPTOMS: SHORTNESS OF BREATH: 1

## 2019-05-20 NOTE — ED AVS SNAPSHOT
Emergency Department  [address] [facility] [address]  Phone:  [phone]  Fax:  [phone]                                    La Forrest   MRN: [id number]    Department:   Emergency Department   Date of Visit:  5/20/2019           After Visit Summary Signature Page    I have received my discharge instructions, and my questions have been answered. I have discussed any challenges I see with this plan with the nurse or doctor.    ..........................................................................................................................................  Patient/Patient Representative Signature      ..........................................................................................................................................  Patient Representative Print Name and Relationship to Patient    ..................................................               ................................................  Date                                   Time    ..........................................................................................................................................  Reviewed by Signature/Title    ...................................................              ..............................................  Date                                               Time          22EPIC Rev 08/18
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
## 2019-05-21 NOTE — ED PROVIDER NOTES
"  History     Chief Complaint:  Alcohol Intoxication & Shortness of Breath      HPI   History limited secondary to the patient's altered mental status. History supplemented by EMS report.    La Forrest is a 69 year old female with a history of alcohol abuse, COPD, hypertension, subarachnoid hemorrhage, hypernatremia, and drug seeking behavior who presents to the ED for evaluation of alcohol intoxication and shortness of breath. EMS reports that the patient called police tonight wanting to go to [facility]. Police told her that there were no female beds available in detox [facility] and so brought her to the ED for evaluation. Here the patient states, \"I have COPD and can't breathe.\" She denies that she has an inhaler or any other medications at home.     Of [facility] and states that her last drink was one hour prior to arrival. When asked where she is currently staying, the patient replies \"it doesn't matter,\" and she notes that she would like to go to detox [facility]. Per chart review, the patient has been seen in the ED multiple times in the setting of alcohol intoxication. She was just seen at the [facility] one week ago for COPD and alcohol intoxication, and five days ago she presented to the [place] emergency department for the same. The patient does have an ER Care Plan on file due to her multiple visits.    Allergies:  NKDA     Medications:    Albuterol  Spiriva  Klor-Con    Past Medical History:    COPD  Alcohol abuse  Centrilobular emphysema  Drug-seeking behavior  Acute respiratory failure  Hypertension   Subarachnoid hemorrhage  Homelessness  Suicidal ideation  Bilateral cataract    Past Surgical History:    The patient does not have any pertinent past surgical history.    Family History:    No past pertinent family history.    Social History:  Smoking Status: Former smoker  Alcohol Use: Yes - history of alcohol abuse and current " "use  Marital Status:   [4]  History of homelessness     Review of Systems   Unable to perform ROS: Mental status change   Respiratory: Positive for shortness of breath.      Physical Exam     Patient Vitals for the past 24 hrs:   BP Temp Temp src Resp SpO2 Height Weight   05/20/19 2215 99/62 97.6  F (36.4  C) Temporal 18 99 % 1.549 m (5' 1\") 52.2 kg (115 lb)        Physical Exam    General: Lying on her side, eating food  Eyes:  The pupils are equal and round    Conjunctivae and sclerae are normal  ENT:    No head trauma  Neck:  Normal range of motion  CV:  Regular rate and rhythm    Skin warm and well perfused   Resp:  Lungs are clear    Non-labored    No rales    No wheezing   MS:  Normal muscular tone  Skin:  No rash or acute skin lesions noted  Neuro:   Awake, alert.      Speech is normal and fluent.    Face is symmetric.     Moves all extremities equally  Psych: Normal affect.  Appropriate interactions.     Emergency Department Course     Interventions:  Medications   albuterol (PROAIR HFA/PROVENTIL HFA/VENTOLIN HFA) 108 (90 Base) MCG/ACT inhaler 2 puff (has no administration in time range)        Emergency Department Course:  Nursing notes and vitals reviewed. (2128) I performed an exam of the patient as documented above.     (2240) RN staff informed me that they had contacted [place] [id number] and [place], and no female detox beds were available.     (2245) I rechecked the patient and discussed the results of her workup thus far.     Findings and plan explained to the Patient. Patient discharged home with instructions regarding supportive care, medications, and reasons to return. The importance of close follow-up was reviewed.     I personally reviewed the physical exam results with the Patient and answered all related questions prior to discharge.     Impression & Plan      Medical Decision Making:  La Forrest is a 69 year old female with a history of chronic alcohol abuse who presents to " the emergency department for evaluation of alcohol intoxication.  Exam is consistent with isolated alcohol intoxication and the patient's mental status and ability to walk steadily improved with time. Unfortunately no detox beds were able to be secured tonight. After a period of close monitoring in the ED, I now consider the patient clinically sober such that discharge is safe. Patient denies any thoughts of self harm.  The patient declined my help with alcohol abuse resources.  She can return to the ED for acute problems. Has hx of COPD but no increase in work of breathing tonight with no hypoxia. Doubt acute exacerbation of COPD. Doubt pneumonia, PE, ACS, etc given her presentation. Has similar presentations in multiple different EDs recently.    Diagnosis:    ICD-10-CM    1. Alcoholic intoxication without complication (H) F10.920    2. Chronic obstructive pulmonary disease, unspecified COPD type (H) J44.9        Disposition:  discharged to home    Discharge Medications:     Medication List      There are no discharge medications for this visit.         Scribe Disclosure:  I, Alysha Helton, am serving as a scribe on 5/20/2019 at 10:47 PM to personally document services performed by Jade Torres MD based on my observations and the provider's statements to me.      Alysha Helton  5/20/2019    EMERGENCY DEPARTMENT       Jade Torres MD  05/20/19 9986

## 2019-05-21 NOTE — DISCHARGE INSTRUCTIONS
Use inhaler as needed    Discharge Instructions  Alcohol Intoxication    You have been seen today with alcohol intoxication. This means that you have enough alcohol in your system to impair your ability to mentally and physically function, perhaps to the extent that you were unable to care for yourself.    Generally, every Emergency Department visit should have a follow-up clinic visit with either a primary or a specialty clinic/provider. Please follow-up as instructed by your emergency provider today.    You may have come to the Emergency Department because of your intoxication, or for another reason, such as because of an injury. No matter what the case is, this visit is a ?red flag? regarding alcohol use, and you should consider whether your drinking pattern is a problem for you.     You may be at risk for alcohol-related problems if:    Men: you drink more than 14 drinks per week, or more than 4 drinks per occasion.    Women: you drink more than 7 drinks per week or more than 3 drinks per occasion.    You have black-outs.  You do things you regret while drinking.  You have legal problems because of drinking.  You have job problems because of drinking (you call in sick to work because of drinking).    CAGE Questions  Have you ever felt you should cut down on your drinking?  Have people annoyed you by criticizing your drinking?  Have you ever felt bad or guilty about your drinking?  Have you ever had a drink first thing in the morning to steady your nerves or get rid of a hangover (eye opener)?    If you answer yes to any of the CAGE questions, you may have a problem with alcohol.      Return to the Emergency Department if:  You become shaky or tremble when you try to stop drinking.   You have severe abdominal pain (belly pain).   You have a seizure or pass out.    You vomit (throw up) blood or have blood in your stool. This may be bright red or it may look like black coffee grounds.  You become lightheaded or  faint.      For further help, contact:   Your caregiver.    Alcoholics Anonymous (AA).    [facility]: [phone]  [facility] Central Office: [phone]   A drug or alcohol rehabilitation program.    You can get information on alcohol resources and groups by calling the number 211 or 1-431.613.4482 on any phone.     Seek medical care if:  You have persistent vomiting.   You have persistent pain in any part of your body.    You do not feel better after a few days.    If you were given a prescription for medicine here today, be sure to read all of the information (including the package insert) that comes with your prescription.  This will include important information about the medicine, its side effects, and any warnings that you need to know about.  The pharmacist who fills the prescription can provide more information and answer questions you may have about the medicine.  If you have questions or concerns that the pharmacist cannot address, please call or return to the Emergency Department.   Remember that you can always come back to the Emergency Department if you are not able to see your regular doctor in the amount of time listed above, if you get any new symptoms, or if there is anything that worries you.

## 2019-06-02 ENCOUNTER — HOSPITAL ENCOUNTER (EMERGENCY)
Facility: CLINIC | Age: 69
Discharge: HOME | End: 2019-06-02
Attending: EMERGENCY MEDICINE | Admitting: EMERGENCY MEDICINE
Payer: MEDICARE

## 2019-06-02 VITALS
RESPIRATION RATE: 15 BRPM | OXYGEN SATURATION: 92 % | SYSTOLIC BLOOD PRESSURE: 101 MMHG | DIASTOLIC BLOOD PRESSURE: 65 MMHG | TEMPERATURE: 98.3 F | HEIGHT: 65 IN | WEIGHT: 110 LBS | BODY MASS INDEX: 18.33 KG/M2

## 2019-06-02 DIAGNOSIS — F10.920 ALCOHOLIC INTOXICATION WITHOUT COMPLICATION (H): ICD-10-CM

## 2019-06-02 LAB — ALCOHOL BREATH TEST: 0.13 (ref 0–0.01)

## 2019-06-02 PROCEDURE — 25000132 ZZH RX MED GY IP 250 OP 250 PS 637: Mod: GY | Performed by: EMERGENCY MEDICINE

## 2019-06-02 PROCEDURE — 82075 ASSAY OF BREATH ETHANOL: CPT

## 2019-06-02 PROCEDURE — 99285 EMERGENCY DEPT VISIT HI MDM: CPT

## 2019-06-02 PROCEDURE — A9270 NON-COVERED ITEM OR SERVICE: HCPCS | Mod: GY | Performed by: EMERGENCY MEDICINE

## 2019-06-02 RX ORDER — IPRATROPIUM BROMIDE AND ALBUTEROL SULFATE 2.5; .5 MG/3ML; MG/3ML
3 SOLUTION RESPIRATORY (INHALATION) ONCE
Status: DISCONTINUED | OUTPATIENT
Start: 2019-06-02 | End: 2019-06-03 | Stop reason: HOSPADM

## 2019-06-02 RX ORDER — ACETAMINOPHEN 325 MG/1
650 TABLET ORAL ONCE
Status: COMPLETED | OUTPATIENT
Start: 2019-06-02 | End: 2019-06-02

## 2019-06-02 RX ADMIN — ACETAMINOPHEN 650 MG: 325 TABLET, FILM COATED ORAL at 21:40

## 2019-06-02 ASSESSMENT — MIFFLIN-ST. JEOR: SCORE: 1034.84

## 2019-06-02 NOTE — ED AVS SNAPSHOT
Emergency Department  [address] [facility] [address]  Phone:  [phone]  Fax:  [phone]                                    La Forrest   MRN: [id number]    Department:   Emergency Department   Date of Visit:  6/2/2019           After Visit Summary Signature Page    I have received my discharge instructions, and my questions have been answered. I have discussed any challenges I see with this plan with the nurse or doctor.    ..........................................................................................................................................  Patient/Patient Representative Signature      ..........................................................................................................................................  Patient Representative Print Name and Relationship to Patient    ..................................................               ................................................  Date                                   Time    ..........................................................................................................................................  Reviewed by Signature/Title    ...................................................              ..............................................  Date                                               Time          22EPIC Rev 08/18

## 2019-06-03 NOTE — ED PROVIDER NOTES
"  History     Chief Complaint:  Alcohol Intoxication (Pt states she drank a pint of rum today.)      HPI   HPI is limited due to the patients mental status.  La Forrest is a 69 year old female who presents to the emergency department today for evaluation of alcohol intoxication, leg pain, and COPD complications. Police picked her up wandering in the streets flagging down cars. Patient notes she was \"trying to get to the hospital\". She notes she lives with a friend in [place], and would be willing to go to a detox center [facility].  She also notes slight knee pain because she fell a few months back. She states that her COPD is acting up and would like a breathing treatment.    Allergies:  No Known Drug Allergies      Medications:    No current outpatient medications on file.     Past Medical History:    COPD    Past Surgical History:    History reviewed. No pertinent surgical history.     Family History:    History reviewed. No pertinent family history.    Social History:  Smoking Status: current  Smokeless Tobacco: no  Alcohol Use: yes, binge   Marital Status:   [4]     Review of Systems   Unable to perform ROS: Mental status change       Physical Exam     Patient Vitals for the past 24 hrs:   BP Temp Temp src Heart Rate Resp SpO2 Height Weight   06/02/19 2321 101/65 -- -- 85 -- 92 % -- --   06/02/19 2114 -- 98.3  F (36.8  C) Oral -- -- -- -- --   06/02/19 2051 98/62 -- Oral 76 15 93 % 1.651 m (5' 5\") 49.9 kg (110 lb)        Physical Exam  VS: Reviewed per above  HENT: Mucous membranes moist  EYES: sclera anicteric  CV: Rate as noted, regular rhythm.   RESP: Effort normal. Breath sounds are normal bilaterally.  GI: no tenderness, not distended.  NEURO: Alert, moving all extremities equally  MSK: No deformity of the extremities  SKIN: Warm and dry    Emergency Department Course   Laboratory:  EtOH level: 0.113     Interventions:  Acetaminophen 650 mg PO.    Emergency Department Course:  Past " medical records, nursing notes, and vitals reviewed.  2055: I performed an exam of the patient and obtained history, as documented above.  2308: I rechecked the patient. Findings and plan explained to the Patient. Patient discharged home with instructions regarding supportive care, medications, and reasons to return. The importance of close follow-up was reviewed.         Impression & Plan      Medical Decision Making:  Patient presents to the ER for evaluation of alcohol intoxication.  Initial vital signs within normal limits.  Exam is unrevealing.  Lung sounds are clear.  Right knee does not have any effusion or redness or warmth to suggest occult infectious arthritis.  I offered a DuoNeb but patient declined it later in ER course.  Breath alcohol per EMS was 0.2.  Here it was 0.113.  After period of observation, patient tolerated p.o. and was deemed clinically sober.  She was discharged with recommendations to follow-up with the primary care setting.    Diagnosis:    ICD-10-CM    1. Alcoholic intoxication without complication (H) F10.920        Disposition:  discharged to home    Discharge Medications:     Medication List      There are no discharge medications for this visit.           Myrna Zamorano  6/2/2019    EMERGENCY DEPARTMENT  Scribe Disclosure:  I, Myrna Zamorano, am serving as a scribe at 2055 PM on 6/2/2019 to document services personally performed by Stanley Fishman MD based on my observations and the provider's statements to me.       Stanley Fishman MD  06/03/19 0150

## 2019-06-03 NOTE — ED NOTES
Video Observation initiated, patient informed. Pt changed into behavioral scrubs cooperatively, cameras turned off to maintain privacy.    Taty Flowers RN

## 2019-06-03 NOTE — DISCHARGE INSTRUCTIONS

## 2019-06-03 NOTE — ED TRIAGE NOTES
Pt brought in by PD found drunk and waving down cars in the road. Put on  hold. PD states she blew a 0.20

## 2019-06-03 NOTE — ED NOTES
Pt asked to change back into her clothing. Pt told she is discharged, became upset and threw belongings bag down angrily.

## 2019-06-03 NOTE — ED NOTES
Attempted 3 times for breathalyzer, pt's breaths were not strong enough to obtain a successful reading.

## 2020-01-01 ENCOUNTER — TRANSFERRED RECORDS (OUTPATIENT)
Dept: HEALTH INFORMATION MANAGEMENT | Facility: CLINIC | Age: 70
End: 2020-01-01

## 2020-01-01 ENCOUNTER — TRANSCRIBE ORDERS (OUTPATIENT)
Dept: OTHER | Age: 70
End: 2020-01-01

## 2020-01-01 ENCOUNTER — ONCOLOGY VISIT (OUTPATIENT)
Dept: ONCOLOGY | Facility: CLINIC | Age: 70
End: 2020-01-01
Attending: SURGERY
Payer: MEDICARE

## 2020-01-01 ENCOUNTER — PRE VISIT (OUTPATIENT)
Dept: ONCOLOGY | Facility: CLINIC | Age: 70
End: 2020-01-01

## 2020-01-01 ENCOUNTER — HOSPITAL ENCOUNTER (EMERGENCY)
Facility: CLINIC | Age: 70
Discharge: HOME | End: 2020-01-10
Attending: EMERGENCY MEDICINE | Admitting: EMERGENCY MEDICINE
Payer: MEDICARE

## 2020-01-01 ENCOUNTER — PATIENT OUTREACH (OUTPATIENT)
Dept: SURGERY | Facility: CLINIC | Age: 70
End: 2020-01-01

## 2020-01-01 ENCOUNTER — HOSPITAL ENCOUNTER (EMERGENCY)
Facility: CLINIC | Age: 70
Discharge: HOME | End: 2020-03-18
Attending: EMERGENCY MEDICINE | Admitting: EMERGENCY MEDICINE
Payer: MEDICARE

## 2020-01-01 ENCOUNTER — APPOINTMENT (OUTPATIENT)
Dept: GENERAL RADIOLOGY | Facility: CLINIC | Age: 70
End: 2020-01-01
Attending: EMERGENCY MEDICINE
Payer: MEDICARE

## 2020-01-01 ENCOUNTER — MEDICAL CORRESPONDENCE (OUTPATIENT)
Dept: HEALTH INFORMATION MANAGEMENT | Facility: CLINIC | Age: 70
End: 2020-01-01

## 2020-01-01 ENCOUNTER — HOSPITAL ENCOUNTER (EMERGENCY)
Facility: CLINIC | Age: 70
Discharge: HOME | End: 2020-01-08
Attending: EMERGENCY MEDICINE | Admitting: EMERGENCY MEDICINE
Payer: MEDICARE

## 2020-01-01 ENCOUNTER — TELEPHONE (OUTPATIENT)
Dept: ONCOLOGY | Facility: CLINIC | Age: 70
End: 2020-01-01

## 2020-01-01 VITALS
HEIGHT: 65 IN | SYSTOLIC BLOOD PRESSURE: 151 MMHG | RESPIRATION RATE: 28 BRPM | HEART RATE: 107 BPM | DIASTOLIC BLOOD PRESSURE: 137 MMHG | BODY MASS INDEX: 19.99 KG/M2 | OXYGEN SATURATION: 96 % | TEMPERATURE: 97.7 F | WEIGHT: 120 LBS

## 2020-01-01 VITALS
HEIGHT: 65 IN | WEIGHT: 119.3 LBS | HEART RATE: 89 BPM | RESPIRATION RATE: 18 BRPM | SYSTOLIC BLOOD PRESSURE: 120 MMHG | DIASTOLIC BLOOD PRESSURE: 68 MMHG | TEMPERATURE: 97.7 F | BODY MASS INDEX: 19.88 KG/M2 | OXYGEN SATURATION: 96 %

## 2020-01-01 VITALS
TEMPERATURE: 98 F | SYSTOLIC BLOOD PRESSURE: 125 MMHG | OXYGEN SATURATION: 93 % | DIASTOLIC BLOOD PRESSURE: 85 MMHG | RESPIRATION RATE: 16 BRPM | HEART RATE: 97 BPM

## 2020-01-01 VITALS
DIASTOLIC BLOOD PRESSURE: 87 MMHG | RESPIRATION RATE: 18 BRPM | SYSTOLIC BLOOD PRESSURE: 128 MMHG | OXYGEN SATURATION: 97 % | HEART RATE: 87 BPM

## 2020-01-01 DIAGNOSIS — F17.210 CIGARETTE SMOKER: ICD-10-CM

## 2020-01-01 DIAGNOSIS — F10.220 ALCOHOL DEPENDENCE WITH UNCOMPLICATED INTOXICATION (H): ICD-10-CM

## 2020-01-01 DIAGNOSIS — J44.1 COPD EXACERBATION (H): ICD-10-CM

## 2020-01-01 DIAGNOSIS — R41.82 ALTERED MENTAL STATUS, UNSPECIFIED ALTERED MENTAL STATUS TYPE: ICD-10-CM

## 2020-01-01 DIAGNOSIS — C80.1 SIGNET RING CELL CARCINOMA (H): Primary | ICD-10-CM

## 2020-01-01 DIAGNOSIS — F10.121 ALCOHOL INTOXICATION DELIRIUM (H): ICD-10-CM

## 2020-01-01 DIAGNOSIS — F10.920 ALCOHOLIC INTOXICATION WITHOUT COMPLICATION (H): ICD-10-CM

## 2020-01-01 DIAGNOSIS — C80.1 SIGNET RING CELL CARCINOMA (H): ICD-10-CM

## 2020-01-01 LAB
ALCOHOL BREATH TEST: 0.16 (ref 0–0.01)
ALCOHOL BREATH TEST: NORMAL (ref 0–0.01)
ANION GAP SERPL CALCULATED.3IONS-SCNC: 9 MMOL/L (ref 3–14)
BASE DEFICIT BLDV-SCNC: 0.9 MMOL/L
BASOPHILS # BLD AUTO: 0 10E9/L (ref 0–0.2)
BASOPHILS NFR BLD AUTO: 0 %
BUN SERPL-MCNC: 7 MG/DL (ref 7–30)
CALCIUM SERPL-MCNC: 8 MG/DL (ref 8.5–10.1)
CHLORIDE SERPL-SCNC: 112 MMOL/L (ref 94–109)
CO2 SERPL-SCNC: 25 MMOL/L (ref 20–32)
COPATH REPORT: NORMAL
COPATH REPORT: NORMAL
CREAT SERPL-MCNC: 0.55 MG/DL (ref 0.52–1.04)
DIFFERENTIAL METHOD BLD: ABNORMAL
EOSINOPHIL # BLD AUTO: 0 10E9/L (ref 0–0.7)
EOSINOPHIL NFR BLD AUTO: 0.5 %
ERYTHROCYTE [DISTWIDTH] IN BLOOD BY AUTOMATED COUNT: 18.1 % (ref 10–15)
ETHANOL SERPL-MCNC: 0.26 G/DL
GFR SERPL CREATININE-BSD FRML MDRD: >90 ML/MIN/{1.73_M2}
GLUCOSE SERPL-MCNC: 71 MG/DL (ref 70–99)
HCO3 BLDV-SCNC: 25 MMOL/L (ref 21–28)
HCT VFR BLD AUTO: 32 % (ref 35–47)
HGB BLD-MCNC: 10.6 G/DL (ref 11.7–15.7)
IMM GRANULOCYTES # BLD: 0 10E9/L (ref 0–0.4)
IMM GRANULOCYTES NFR BLD: 0.3 %
INTERPRETATION ECG - MUSE: NORMAL
LYMPHOCYTES # BLD AUTO: 2.6 10E9/L (ref 0.8–5.3)
LYMPHOCYTES NFR BLD AUTO: 43.6 %
MCH RBC QN AUTO: 29.9 PG (ref 26.5–33)
MCHC RBC AUTO-ENTMCNC: 33.1 G/DL (ref 31.5–36.5)
MCV RBC AUTO: 90 FL (ref 78–100)
MONOCYTES # BLD AUTO: 0.6 10E9/L (ref 0–1.3)
MONOCYTES NFR BLD AUTO: 10.5 %
NEUTROPHILS # BLD AUTO: 2.7 10E9/L (ref 1.6–8.3)
NEUTROPHILS NFR BLD AUTO: 45.1 %
NRBC # BLD AUTO: 0 10*3/UL
NRBC BLD AUTO-RTO: 0 /100
O2/TOTAL GAS SETTING VFR VENT: 21 %
PCO2 BLDV: 42 MM HG (ref 40–50)
PH BLDV: 7.37 PH (ref 7.32–7.43)
PLATELET # BLD AUTO: 344 10E9/L (ref 150–450)
PO2 BLDV: 37 MM HG (ref 25–47)
POTASSIUM SERPL-SCNC: 3.3 MMOL/L (ref 3.4–5.3)
RBC # BLD AUTO: 3.54 10E12/L (ref 3.8–5.2)
SODIUM SERPL-SCNC: 146 MMOL/L (ref 133–144)
TROPONIN I BLD-MCNC: 0 UG/L (ref 0–0.08)
TROPONIN I SERPL-MCNC: <0.015 UG/L (ref 0–0.04)
TROPONIN I SERPL-MCNC: <0.015 UG/L (ref 0–0.04)
WBC # BLD AUTO: 6 10E9/L (ref 4–11)

## 2020-01-01 PROCEDURE — 93005 ELECTROCARDIOGRAM TRACING: CPT | Performed by: EMERGENCY MEDICINE

## 2020-01-01 PROCEDURE — 93010 ELECTROCARDIOGRAM REPORT: CPT | Mod: Z6 | Performed by: EMERGENCY MEDICINE

## 2020-01-01 PROCEDURE — G0463 HOSPITAL OUTPT CLINIC VISIT: HCPCS | Mod: ZF

## 2020-01-01 PROCEDURE — A9270 NON-COVERED ITEM OR SERVICE: HCPCS | Mod: GY | Performed by: EMERGENCY MEDICINE

## 2020-01-01 PROCEDURE — 94640 AIRWAY INHALATION TREATMENT: CPT | Performed by: EMERGENCY MEDICINE

## 2020-01-01 PROCEDURE — 25000132 ZZH RX MED GY IP 250 OP 250 PS 637: Mod: GY | Performed by: EMERGENCY MEDICINE

## 2020-01-01 PROCEDURE — 80048 BASIC METABOLIC PNL TOTAL CA: CPT | Performed by: EMERGENCY MEDICINE

## 2020-01-01 PROCEDURE — 84484 ASSAY OF TROPONIN QUANT: CPT | Mod: 91

## 2020-01-01 PROCEDURE — 99285 EMERGENCY DEPT VISIT HI MDM: CPT | Mod: 25 | Performed by: EMERGENCY MEDICINE

## 2020-01-01 PROCEDURE — 99284 EMERGENCY DEPT VISIT MOD MDM: CPT | Mod: 25

## 2020-01-01 PROCEDURE — 99284 EMERGENCY DEPT VISIT MOD MDM: CPT | Mod: 25 | Performed by: EMERGENCY MEDICINE

## 2020-01-01 PROCEDURE — 84484 ASSAY OF TROPONIN QUANT: CPT | Performed by: EMERGENCY MEDICINE

## 2020-01-01 PROCEDURE — 82075 ASSAY OF BREATH ETHANOL: CPT

## 2020-01-01 PROCEDURE — 00000346 ZZHCL STATISTIC REVIEW OUTSIDE SLIDES TC 88321: Performed by: SURGERY

## 2020-01-01 PROCEDURE — 25000131 ZZH RX MED GY IP 250 OP 636 PS 637: Mod: GY | Performed by: EMERGENCY MEDICINE

## 2020-01-01 PROCEDURE — 25000125 ZZHC RX 250: Performed by: EMERGENCY MEDICINE

## 2020-01-01 PROCEDURE — 93005 ELECTROCARDIOGRAM TRACING: CPT

## 2020-01-01 PROCEDURE — 94640 AIRWAY INHALATION TREATMENT: CPT

## 2020-01-01 PROCEDURE — 85025 COMPLETE CBC W/AUTO DIFF WBC: CPT | Performed by: EMERGENCY MEDICINE

## 2020-01-01 PROCEDURE — 71046 X-RAY EXAM CHEST 2 VIEWS: CPT

## 2020-01-01 PROCEDURE — 82803 BLOOD GASES ANY COMBINATION: CPT | Performed by: EMERGENCY MEDICINE

## 2020-01-01 PROCEDURE — 80320 DRUG SCREEN QUANTALCOHOLS: CPT | Performed by: EMERGENCY MEDICINE

## 2020-01-01 PROCEDURE — 71045 X-RAY EXAM CHEST 1 VIEW: CPT

## 2020-01-01 RX ORDER — PREDNISONE 20 MG/1
40 TABLET ORAL ONCE
Status: DISCONTINUED | OUTPATIENT
Start: 2020-01-01 | End: 2020-01-01 | Stop reason: HOSPADM

## 2020-01-01 RX ORDER — PREDNISONE 20 MG/1
TABLET ORAL
Qty: 10 TABLET | Refills: 0 | Status: SHIPPED | OUTPATIENT
Start: 2020-01-01 | End: 2020-01-01

## 2020-01-01 RX ORDER — IPRATROPIUM BROMIDE AND ALBUTEROL SULFATE 2.5; .5 MG/3ML; MG/3ML
3 SOLUTION RESPIRATORY (INHALATION) ONCE
Status: COMPLETED | OUTPATIENT
Start: 2020-01-01 | End: 2020-01-01

## 2020-01-01 RX ORDER — MORPHINE SULFATE 15 MG/1
30 TABLET, FILM COATED, EXTENDED RELEASE ORAL 2 TIMES DAILY
COMMUNITY
Start: 2020-01-01

## 2020-01-01 RX ORDER — ALBUTEROL SULFATE 90 UG/1
2 AEROSOL, METERED RESPIRATORY (INHALATION) EVERY 6 HOURS PRN
Qty: 1 INHALER | Refills: 0 | Status: SHIPPED | OUTPATIENT
Start: 2020-01-01

## 2020-01-01 RX ORDER — DOXYCYCLINE 100 MG/1
100 CAPSULE ORAL ONCE
Status: DISCONTINUED | OUTPATIENT
Start: 2020-01-01 | End: 2020-01-01 | Stop reason: HOSPADM

## 2020-01-01 RX ORDER — ALBUTEROL SULFATE 90 UG/1
2 AEROSOL, METERED RESPIRATORY (INHALATION) EVERY 6 HOURS PRN
Qty: 1 INHALER | Refills: 0 | Status: SHIPPED | OUTPATIENT
Start: 2020-01-01 | End: 2020-01-01

## 2020-01-01 RX ORDER — HYDROMORPHONE HYDROCHLORIDE 2 MG/1
4-6 TABLET ORAL EVERY 4 HOURS PRN
COMMUNITY
Start: 2020-01-01

## 2020-01-01 RX ORDER — DOXYCYCLINE 100 MG/1
100 CAPSULE ORAL 2 TIMES DAILY
Qty: 14 CAPSULE | Refills: 0 | Status: SHIPPED | OUTPATIENT
Start: 2020-01-01

## 2020-01-01 RX ORDER — DOXYCYCLINE 100 MG/1
100 CAPSULE ORAL 2 TIMES DAILY
Qty: 14 CAPSULE | Refills: 0 | Status: SHIPPED | OUTPATIENT
Start: 2020-01-01 | End: 2020-01-01

## 2020-01-01 RX ORDER — PREDNISONE 20 MG/1
40 TABLET ORAL ONCE
Status: COMPLETED | OUTPATIENT
Start: 2020-01-01 | End: 2020-01-01

## 2020-01-01 RX ORDER — PREDNISONE 20 MG/1
TABLET ORAL
Qty: 10 TABLET | Refills: 0 | Status: SHIPPED | OUTPATIENT
Start: 2020-01-01

## 2020-01-01 RX ORDER — ALBUTEROL SULFATE 90 UG/1
2 AEROSOL, METERED RESPIRATORY (INHALATION) ONCE
Status: COMPLETED | OUTPATIENT
Start: 2020-01-01 | End: 2020-01-01

## 2020-01-01 RX ADMIN — ALBUTEROL SULFATE 2 PUFF: 90 AEROSOL, METERED RESPIRATORY (INHALATION) at 00:05

## 2020-01-01 RX ADMIN — PREDNISONE 40 MG: 20 TABLET ORAL at 05:03

## 2020-01-01 RX ADMIN — PREDNISONE 40 MG: 20 TABLET ORAL at 00:11

## 2020-01-01 RX ADMIN — IPRATROPIUM BROMIDE AND ALBUTEROL SULFATE 3 ML: .5; 3 SOLUTION RESPIRATORY (INHALATION) at 01:04

## 2020-01-01 RX ADMIN — IPRATROPIUM BROMIDE AND ALBUTEROL SULFATE 3 ML: .5; 3 SOLUTION RESPIRATORY (INHALATION) at 05:25

## 2020-01-01 ASSESSMENT — ENCOUNTER SYMPTOMS
HEADACHES: 0
COUGH: 1
HALLUCINATIONS: 0
FEVER: 0
CHILLS: 0
SORE THROAT: 0
DIARRHEA: 0
NAUSEA: 0
WEAKNESS: 0
ARTHRALGIAS: 0
CONSTIPATION: 0
ABDOMINAL DISTENTION: 0
CONFUSION: 0
DYSURIA: 0
NECK PAIN: 0
COUGH: 1
DIFFICULTY URINATING: 0
HEADACHES: 0
WOUND: 0
HEADACHES: 0
PALPITATIONS: 0
LIGHT-HEADEDNESS: 0
NECK STIFFNESS: 0
DYSURIA: 0
MYALGIAS: 0
NECK PAIN: 0
COLOR CHANGE: 0
FLANK PAIN: 0
BACK PAIN: 0
CHILLS: 0
DIFFICULTY URINATING: 0
SHORTNESS OF BREATH: 1
DIAPHORESIS: 0
DYSURIA: 0
CHEST TIGHTNESS: 0
NECK STIFFNESS: 0
ABDOMINAL PAIN: 0
FREQUENCY: 0
EYE PAIN: 0
ABDOMINAL PAIN: 0
CHEST TIGHTNESS: 0
FEVER: 0
LIGHT-HEADEDNESS: 0
DIZZINESS: 0
NAUSEA: 0
BACK PAIN: 0
NAUSEA: 0
SORE THROAT: 0
JOINT SWELLING: 0
BRUISES/BLEEDS EASILY: 0
COUGH: 0
SORE THROAT: 0
DIARRHEA: 0
CHILLS: 0
DIARRHEA: 0
DIZZINESS: 0
WEAKNESS: 0
APPETITE CHANGE: 0
PALPITATIONS: 0
VOMITING: 0
RHINORRHEA: 0
CONFUSION: 0
VOMITING: 0
FATIGUE: 1
NECK PAIN: 0
SHORTNESS OF BREATH: 1
FEVER: 0
VOMITING: 0
SHORTNESS OF BREATH: 1
PALPITATIONS: 0
MYALGIAS: 0
UNEXPECTED WEIGHT CHANGE: 0
ARTHRALGIAS: 0
DIZZINESS: 0
FATIGUE: 0

## 2020-01-01 ASSESSMENT — MIFFLIN-ST. JEOR
SCORE: 1080.2
SCORE: 1011.24
SCORE: 1077.02

## 2020-01-01 ASSESSMENT — PAIN SCALES - GENERAL: PAINLEVEL: NO PAIN (0)

## 2020-01-08 NOTE — ED AVS SNAPSHOT
[facility] [place], Emergency Department  [address]  [facility] [address]  Phone:  [phone]  Fax:  [phone]                                    La Forrest   MRN: [id number]    Department:  [facility], Emergency Department   Date of Visit:  1/8/2020           After Visit Summary Signature Page    I have received my discharge instructions, and my questions have been answered. I have discussed any challenges I see with this plan with the nurse or doctor.    ..........................................................................................................................................  Patient/Patient Representative Signature      ..........................................................................................................................................  Patient Representative Print Name and Relationship to Patient    ..................................................               ................................................  Date                                   Time    ..........................................................................................................................................  Reviewed by Signature/Title    ...................................................              ..............................................  Date                                               Time          22EPIC Rev 08/18

## 2020-01-08 NOTE — DISCHARGE INSTRUCTIONS
Avoid excessive alcohol use.  Take antibiotic and prednisone as directed.  Follow up this week with your primary care clinic provider.  Use inhaler as needed.

## 2020-01-08 NOTE — ED NOTES
Patient due to discharge at 0630. She has refused a dose of prednisone and doxycycline for her COPD exacerbation. After signing the discharge paperwork, patient curled under the blankets stating she will sleep and leave at 7 AM. Not easily redirectable. Asking for juice and given 6 4 oz juices. Writer has given her a bus token and security has been informed to assist with discharging as remains reluctant to discharge.

## 2020-01-08 NOTE — ED NOTES
Patient sleeping and not in respiratory distress when entering room. Patient defensive and refusing to answer questions.

## 2020-01-08 NOTE — ED PROVIDER NOTES
"    [facility] EMERGENCY DEPARTMENT ([facility])    1/08/20        History     Chief Complaint   Patient presents with     Respiratory Distress     \" I cant breathe.\"     The history is provided by the patient and medical records.     La Forrest is a 69 year old female with a past medical history significant for COPD, centrilobular emphysema, tobacco dependence, alcohol abuse, homelessness, frequent falls, drug-seeking behavior, hypoxia, and AMS, with care plan in place who presents here to the Emergency Department due to shortness of breath. Patient reports that she has been short of breath for the past few hours. States she has also had a cough with phlegm. Denies fever. Denies being on antibiotics or prednisone. Reports that she lives with her daughter and has a place to stay. Patient was seen at [facility] yesterday and had a negative CXR. She had her home medications refilled, albuterol refilled and was sent home on a prednisone burst but did not take it. She has a history of frequent ED visits and has a care plan which is reviewed. She has history of homelessness. She has history of alcohol abuse but denies recent use. No chest pain. No suicidal ideation. Denies drug use.    I have reviewed the Medications, Allergies, Past Medical and Surgical History, and Social History in the [employer] system.    Past Medical History:   Diagnosis Date     COPD (chronic obstructive pulmonary disease) (H)        No past surgical history on file.    No family history on file.    Social History     Tobacco Use     Smoking status: Current Every Day Smoker     Packs/day: 1.00     Smokeless tobacco: Never Used   Substance Use Topics     Alcohol use: Yes     Comment: Binges       No current facility-administered medications for this encounter.      Current Outpatient Medications   Medication     albuterol (PROAIR HFA/PROVENTIL HFA/VENTOLIN HFA) 108 (90 Base) MCG/ACT inhaler     doxycycline hyclate (VIBRAMYCIN) 100 " MG capsule     predniSONE (DELTASONE) 20 MG tablet      No Known Allergies      Review of Systems   Constitutional: Positive for fatigue. Negative for appetite change, chills, diaphoresis, fever and unexpected weight change.   HENT: Negative for congestion, rhinorrhea and sore throat.    Eyes: Negative for visual disturbance.   Respiratory: Positive for cough and shortness of breath. Negative for chest tightness.    Cardiovascular: Negative for chest pain, palpitations and leg swelling.   Gastrointestinal: Negative for abdominal pain, diarrhea, nausea and vomiting.   Genitourinary: Negative for difficulty urinating, dysuria and flank pain.   Musculoskeletal: Negative for arthralgias, back pain, joint swelling, myalgias, neck pain and neck stiffness.   Skin: Negative for rash.   Allergic/Immunologic: Negative for immunocompromised state.   Neurological: Negative for dizziness, syncope, weakness, light-headedness and headaches.   Hematological: Does not bruise/bleed easily.   Psychiatric/Behavioral: Negative for confusion, hallucinations and suicidal ideas.   All other systems reviewed and are negative.      Physical Exam   BP: 127/61  Pulse: 97  Heart Rate: 97  Temp: 97.5  F (36.4  C)  Resp: 20  SpO2: 94 %      Physical Exam  Vitals signs and nursing note reviewed.   Constitutional:       General: She is not in acute distress.     Appearance: She is not ill-appearing, toxic-appearing or diaphoretic.   HENT:      Head: Normocephalic and atraumatic.      Right Ear: Tympanic membrane and ear canal normal.      Left Ear: Tympanic membrane and ear canal normal.      Nose: Nose normal.      Mouth/Throat:      Mouth: Mucous membranes are moist.      Pharynx: Oropharynx is clear. No oropharyngeal exudate.   Eyes:      General: No scleral icterus.     Extraocular Movements: Extraocular movements intact.      Conjunctiva/sclera: Conjunctivae normal.      Pupils: Pupils are equal, round, and reactive to light.   Neck:       Musculoskeletal: Normal range of motion and neck supple.   Cardiovascular:      Rate and Rhythm: Normal rate and regular rhythm.      Pulses: Normal pulses.      Heart sounds: Normal heart sounds.   Pulmonary:      Effort: Pulmonary effort is normal. No respiratory distress.      Breath sounds: Rhonchi present. No wheezing or rales.   Abdominal:      General: Bowel sounds are normal.      Palpations: Abdomen is soft.      Tenderness: There is no abdominal tenderness.   Musculoskeletal:         General: No swelling or tenderness.   Skin:     General: Skin is warm and dry.      Findings: No rash.   Neurological:      Mental Status: She is alert and oriented to person, place, and time.   Psychiatric:         Attention and Perception: Attention and perception normal.         Mood and Affect: Affect is blunt and angry.         Speech: Speech normal.         Thought Content: Thought content is not paranoid or delusional. Thought content does not include homicidal or suicidal ideation.         ED Course   12:45 AM  The patient was seen and examined by Naldo Foster MD in Room ED20.        Procedures             EKG Interpretation:      Interpreted by Naldo Foster MD  Time reviewed: 0121  Symptoms at time of EKG: short of breath   Rhythm: normal sinus   Rate: normal  Axis: normal  Ectopy: none  Conduction: normal  ST Segments/ T Waves: No ST-T wave changes  Q Waves: none  Comparison to prior: Unchanged    Clinical Impression: normal EKG          Critical Care time:  none             Labs Ordered and Resulted from Time of ED Arrival Up to the Time of Departure from the ED   ALCOHOL BREATH TEST POCT - Abnormal; Notable for the following components:       Result Value    Alcohol Breath Test 0.160 (*)     All other components within normal limits     Chest XR,  PA & LAT   Final Result   IMPRESSION: No evidence of active cardiopulmonary disease.                 Assessments & Plan (with Medical Decision Making)   Probable  mild COPD exacerbation. No hypoxemia or mental status change and no respiratory distress. No evidence of pneumonia or CHF or acute cardiovascular or other pulmonary event. No evidence of DVT or pulmonary embolism. Note also alcohol intoxication although she denies use. She declines detox treatment. Says she has a place to stay but has history of homelessness. She will stay overnight in the ED and is given resources for emergency shelters. Instructed to follow up with her primary care provider and also is offered  help which she also declines.    I have reviewed the nursing notes.    I have reviewed the findings, diagnosis, plan and need for follow up with the patient.    Discharge Medication List as of 1/8/2020  6:35 AM      START taking these medications    Details   albuterol (PROAIR HFA/PROVENTIL HFA/VENTOLIN HFA) 108 (90 Base) MCG/ACT inhaler Inhale 2 puffs into the lungs every 6 hours as needed for shortness of breath / dyspnea or wheezing, Disp-1 Inhaler, R-0, Local Print      doxycycline hyclate (VIBRAMYCIN) 100 MG capsule Take 1 capsule (100 mg) by mouth 2 times daily for 7 days, Disp-14 capsule, R-0, Local Print      predniSONE (DELTASONE) 20 MG tablet Take two tablets (= 40mg) each day for 5 (five) days, Disp-10 tablet, R-0, Local Print             Final diagnoses:   COPD exacerbation (H)   Alcoholic intoxication without complication (H)     IDereje, am serving as a trained medical scribe to document services personally performed by Naldo Foster MD, based on the provider's statements to me.   INaldo MD, was physically present and have reviewed and verified the accuracy of this note documented by Dereje Davidson.    1/8/2020   [facility], EMERGENCY DEPARTMENT     Naldo Foster MD  01/24/20 9892

## 2020-01-10 NOTE — ED AVS SNAPSHOT
[facility], [place], Emergency Department  [address] [place] [address]  Phone:  [phone]                                    La Forrest   MRN: [id number]    Department:  [facility], Emergency Department   Date of Visit:  1/10/2020           After Visit Summary Signature Page    I have received my discharge instructions, and my questions have been answered. I have discussed any challenges I see with this plan with the nurse or doctor.    ..........................................................................................................................................  Patient/Patient Representative Signature      ..........................................................................................................................................  Patient Representative Print Name and Relationship to Patient    ..................................................               ................................................  Date                                   Time    ..........................................................................................................................................  Reviewed by Signature/Title    ...................................................              ..............................................  Date                                               Time          22EPIC Rev 08/18

## 2020-01-10 NOTE — DISCHARGE INSTRUCTIONS
Please make an appointment to [facility] with [facility] (phone: [phone] as soon as possible.    You need to follow-up with your primary care provider to discuss your medical issues to prevent return trips to the Emergency Department.    Return if you have any further concerns.

## 2020-01-10 NOTE — ED PROVIDER NOTES
"  History     Chief Complaint   Patient presents with     Shortness of Breath     HPI  La Forrest is a 69 year old female who has a past medical history of COPD exacerbation, alcohol use presenting with chest pain shortness of breath.  She was just at outside hospital stating that she was discharged too early.  She has chest pain that she has been having for \"days\".  She has had multiple presentations to emergency departments recently including 8 in the past week.  She is denying fevers or chills.  She is not had lower extremity swelling or pain.  Patient states that she does not have any medications even though she has been given prescriptions.  Pain does not have pressure, diaphoresis, nausea or vomiting.  She is eating and drinking well.  Denies abdominal pain, dysuria.  She is been having normal bowel movements.  Patient is asking for food and water.    I have reviewed the Medications, Allergies, Past Medical and Surgical History, and Social History in the [employer] system.    Review of Systems   Constitutional: Negative for chills and fever.   HENT: Negative for sore throat.    Respiratory: Positive for cough and shortness of breath.    Cardiovascular: Negative for chest pain, palpitations and leg swelling.   Gastrointestinal: Negative for diarrhea, nausea and vomiting.   Genitourinary: Negative for dysuria.   Musculoskeletal: Negative for neck pain and neck stiffness.   Skin: Negative for rash and wound.   Neurological: Negative for dizziness, light-headedness and headaches.   All other systems reviewed and are negative.      Physical Exam   BP: 121/79  Heart Rate: 93  Temp: 97.7  F (36.5  C)  Resp: 24  Height: 165.1 cm (5' 5\")  Weight: 54.4 kg (120 lb)  SpO2: 96 %      Physical Exam  Physical Exam   Constitutional: oriented to person, place, and time. appears well-developed and well-nourished.   HENT:   Head: Normocephalic and atraumatic.   Neck: Normal range of motion.   Pulmonary/Chest: Effort normal. No " respiratory distress. No wheezes.  Cardiac: No murmurs, rubs, gallops. RRR.  Abdominal: Abdomen soft, nontender, nondistended. No rebound tenderness.  MSK: Long bones without deformity or evidence of trauma.  No lower extremity swelling or tenderness palpation.  Neurological: alert and oriented to person, place, and time.   Skin: Skin is warm and dry.   Psychiatric:  normal mood and affect.  behavior is normal. Thought content normal.     ED Course        Procedures             EKG Interpretation:      Interpreted by Arturo Kapadia MD  Time reviewed: 0511  Symptoms at time of EKG: SOB   Rhythm: normal sinus   Rate: normal  Axis: normal  Ectopy: none  Conduction: normal  ST Segments/ T Waves: No ST-T wave changes  Q Waves: none  Comparison to prior: Unchanged    Clinical Impression: normal EKG        Results for orders placed or performed during the hospital encounter of 01/10/20   Troponin I     Status: None   Result Value Ref Range    Troponin I ES <0.015 0.000 - 0.045 ug/L   EKG 12-lead, tracing only     Status: None (Preliminary result)   Result Value Ref Range    Interpretation ECG Click View Image link to view waveform and result    Troponin POCT     Status: None   Result Value Ref Range    Troponin I 0.00 0.00 - 0.08 ug/L       Labs Ordered and Resulted from Time of ED Arrival Up to the Time of Departure from the ED - No data to display         Assessments & Plan (with Medical Decision Making)   MDM   patient presenting with shortness of breath and chest pain.  Patient does have an extensive care plan and multiple recent visits to emergency departments.  Patient's vitals here are stable and she is sleeping on my examination.  Lung sounds are clear.  She is given a DuoNeb and a dose of steroids.  The patient has no signs or symptoms of infection so will defer imaging or lab work-up at this point.  We will do an EKG in addition to an i-STAT troponin.  She had a chest x-ray done recently with no acute changes.   Do not feel repeat is necessary today due to well-appearing patient without abnormal vital signs.    Re eval: Patient sleeping comfortably and has oxygen saturation 100%.  Lungs sound clear.  Patient will be discharged.  She is requesting several hours of sleep however said that we cannot do this for her today.  She will be given refill of some of her key prescriptions.  I have reviewed the nursing notes.    I have reviewed the findings, diagnosis, plan and need for follow up with the patient.    Current Discharge Medication List          Final diagnoses:   COPD exacerbation (H)       1/10/2020   [facility], [place], EMERGENCY DEPARTMENT     Arturo Kapadia MD  01/10/20 0607    Addendum: Patient declined her prescriptions on the way out the door       Arturo Kapadia MD  01/10/20 0671

## 2020-01-10 NOTE — ED TRIAGE NOTES
[place] and Chest Pain.  Patient was at [facility] earlier.  Patient feels was discharge too soon.

## 2020-03-17 NOTE — ED AVS SNAPSHOT
[facility], [place], Emergency Department  [address]  [facility] [address]  Phone:  [phone]  Fax:  [phone]                                    La Forrest   MRN: [id number]    Department:  [facility], Emergency Department   Date of Visit:  3/17/2020           After Visit Summary Signature Page    I have received my discharge instructions, and my questions have been answered. I have discussed any challenges I see with this plan with the nurse or doctor.    ..........................................................................................................................................  Patient/Patient Representative Signature      ..........................................................................................................................................  Patient Representative Print Name and Relationship to Patient    ..................................................               ................................................  Date                                   Time    ..........................................................................................................................................  Reviewed by Signature/Title    ...................................................              ..............................................  Date                                               Time          22EPIC Rev 08/18

## 2020-03-18 NOTE — ED PROVIDER NOTES
"    [facility] EMERGENCY DEPARTMENT ([facility])    3/17/20        History     Chief Complaint   Patient presents with     Alcohol Intoxication     Copd Exacerbation     Pt does not have an inhaler, she lost it and is now having difficulty with breathing     The history is provided by the patient and medical records.     La Forrest is a 69 year old female with a past medical history significant for COPD, centrilobular emphysema, tobacco abuse, alcohol abuse, homelessness, frequent falls, drug seeking behavior with care plan in place who presents here to the Emergency Department due to shortness of breath and alcohol intoxication. Patient reports that she has been short of breath for the past x2 weeks. States that this is worse with exertion. Has not been using her inhaler because she states she lost it. Reports that she is a current smoker. Denies fevers, chills, nausea, vomiting, dysuria or flu like symptoms. Reports that she might have had a \"few drinks\" but denies being intoxicated.    I have reviewed the Medications, Allergies, Past Medical and Surgical History, and Social History in the [employer] system.  PAST MEDICAL HISTORY:   Past Medical History:   Diagnosis Date     COPD (chronic obstructive pulmonary disease) (H)        PAST SURGICAL HISTORY: History reviewed. No pertinent surgical history.    FAMILY HISTORY: History reviewed. No pertinent family history.    SOCIAL HISTORY:   Social History     Tobacco Use     Smoking status: Current Every Day Smoker     Packs/day: 1.00     Smokeless tobacco: Never Used   Substance Use Topics     Alcohol use: Yes     Comment: Binges       Discharge Medication List as of 3/18/2020  6:25 AM      CONTINUE these medications which have CHANGED    Details   albuterol (PROAIR HFA/PROVENTIL HFA/VENTOLIN HFA) 108 (90 Base) MCG/ACT inhaler Inhale 2 puffs into the lungs every 6 hours as needed for shortness of breath / dyspnea or wheezing, Disp-1 Inhaler,R-0, Local " "PrintPharmacy may dispense brand covered by insurance (Proair, or proventil or ventolin or generic albuterol inhaler)      predniSONE (DELTASONE) 20 MG tablet Take two tablets (= 40mg) each day for 5 (five) days, Disp-10 tablet,R-0, Local Print         CONTINUE these medications which have NOT CHANGED    Details   doxycycline hyclate (VIBRAMYCIN) 100 MG capsule Take 1 capsule (100 mg) by mouth 2 times daily, Disp-14 capsule, R-0, Local Print              No Known Allergies     Review of Systems   Constitutional: Negative for chills, fatigue and fever.   HENT: Negative for congestion and sore throat.    Eyes: Negative for pain and visual disturbance.   Respiratory: Positive for shortness of breath. Negative for cough and chest tightness.    Cardiovascular: Negative for chest pain and palpitations.   Gastrointestinal: Negative for abdominal distention, abdominal pain, constipation, diarrhea, nausea and vomiting.   Genitourinary: Negative for difficulty urinating, dysuria, frequency and urgency.   Musculoskeletal: Negative for arthralgias, back pain, myalgias and neck pain.   Skin: Negative for color change and rash.   Neurological: Negative for dizziness, weakness and headaches.   Psychiatric/Behavioral: Negative for confusion.       Physical Exam   BP: 108/82  Pulse: 84  Temp: 96.7  F (35.9  C)(Unable to obtain oral)  Resp: 18  Height: 165.1 cm (5' 5\")  Weight: 54.1 kg (119 lb 4.8 oz)  SpO2: 94 %      Physical Exam  Vitals signs and nursing note reviewed.   Constitutional:       General: She is not in acute distress.     Appearance: Normal appearance. She is not ill-appearing or toxic-appearing.      Comments: Somonlent, intoxicated, etoh smell   HENT:      Head: Normocephalic and atraumatic.      Nose: Nose normal.      Mouth/Throat:      Mouth: Mucous membranes are moist.   Eyes:      Pupils: Pupils are equal, round, and reactive to light.   Neck:      Musculoskeletal: Normal range of motion. No neck rigidity. "   Cardiovascular:      Rate and Rhythm: Normal rate.      Pulses: Normal pulses.      Heart sounds: Normal heart sounds.   Pulmonary:      Effort: Pulmonary effort is normal. No respiratory distress.      Breath sounds: Wheezing present.   Abdominal:      General: Abdomen is flat. There is no distension.   Musculoskeletal: Normal range of motion.         General: No swelling or deformity.   Skin:     General: Skin is warm.      Capillary Refill: Capillary refill takes less than 2 seconds.   Neurological:      Mental Status: She is oriented to person, place, and time.   Psychiatric:         Mood and Affect: Mood normal.         ED Course   11:32 PM  The patient was seen and examined by Romero Serrano DO in Room ED09.         No results found for this or any previous visit (from the past 24 hour(s)).     Medications   albuterol (PROAIR HFA/PROVENTIL HFA/VENTOLIN HFA) 108 (90 Base) MCG/ACT inhaler 2 puff (2 puffs Inhalation Given 3/18/20 0005)   predniSONE (DELTASONE) tablet 40 mg (40 mg Oral Given 3/18/20 0011)       Procedures             EKG Interpretation:      Interpreted by Romero Serrano DO  Time reviewed: 00:29  Symptoms at time of EKG: Shortness of breath   Rhythm: normal sinus   Rate: 78 bpm  Axis: Normal  Ectopy: none  Conduction: normal  ST Segments/ T Waves: No ST-T wave changes and No acute ischemic changes  Q Waves: none  Comparison to prior: Unchanged    Clinical Impression: normal EKG             Assessments & Plan (with Medical Decision Making)   Patient presents for evaluation of shortness of breath.  States is been going for the past 2 weeks.  She also notes that she does not have her inhaler at home.  She is longstanding history of COPD, she continues to smoke.  Patient has a very extensive history of emergency room abuse.  Does have a care plan in place.  Frequently arrives intoxicated on alcohol with a variety of complaints.    On arrival today, patient has normal vital signs.  She is visibly  "intoxicated on alcohol.  Does admit to \"several drinks today\".  On exam, she does have some mild bibasilar wheezing.  Otherwise no signs of respiratory distress.  Physical exam is otherwise unremarkable.  She does not appear toxic or ill.    Differential diagnosis includes COPD exacerbation, viral URI, less likely influenza.  Unlikely coronavirus.  Less likely ACS/PE due to minimal symptoms and lack of risk factors.  Plan for work-up including CBC, BMP, chest x-ray, EKG, will give 40 mg of prednisone and albuterol inhaler.  Anticipate discharge when clinically sober with prescription for new inhaler.    EKG and Chest x-ray normal.  Patient states that her shortness of breath has resolved after the albuterol inhaler.  She is now sleeping.  Her alcohol is elevated.  Will need to be monitored in the emergency department until clinically sober.  Will then discharge with albuterol and prednisone as above.  Will sign out to incoming provider.        I have reviewed the nursing notes.    I have reviewed the findings, diagnosis, plan and need for follow up with the patient.    Discharge Medication List as of 3/18/2020  6:25 AM          Final diagnoses:   Alcohol intoxication delirium (H)   Altered mental status, unspecified altered mental status type   COPD exacerbation (H)     IDereje, am serving as a trained medical scribe to document services personally performed by Romero Serrano DO, based on the provider's statements to me.   Romero ROSARIO DO, was physically present and have reviewed and verified the accuracy of this note documented by Dereje Davidson.    3/17/2020   [facility], [place], EMERGENCY DEPARTMENT     Romero Serrano DO  03/23/20 1415    "

## 2020-03-18 NOTE — DISCHARGE INSTRUCTIONS
Your symptoms are likely secondary to COPD exacerbation.  You have been provided with a prescription for albuterol (inhaler) and prednisone (oral steroid).  You should also quit smoking.  Please make an appointment to follow up with [facility] (phone: [phone] in 7 days for reassessment and to establish with a primary care provider.    Please use the below resources and your primary care physician to safely cease alcohol and/or substance use:    Return to the ED if you are having any urgent/life-threatening concerns.     DISCHARGE RESOURCES:  -[facility] [phone] (detox), [phone] (outpatient & [facility])    -SM[facility] Recovery - self management for addiction recovery:  www.[employer].org    -[facility] ~ [facility]: 915.714.6907.  -[facility] [phone]   -Substance Abuse and Mental Health Services (www.samhsa.gov)  -Harm Reduction Coalition (www. Harmreduction.org)  -[place] Opioid Prevention Coalition: www.opioidcoalition.org  -Poison control 0-897-938-7147       Sober Support Group Information:  AA/NA & Sponsor/Support  -Alcoholics Anonymous (www.alcoholics-anonymous.org): for local information 24 hours/day  -AA Intergroup service office in [place] (http://www.aastpaul.org/) [phone]  -AA Intergroup service office in [facility]: [phone]. (http://www.a[place].org/)  -Narcotics Anonymous (www.nam[place].org) (495) 358-7269   -Sober Fun Activities: www.sober-activities.[employer] ([employer]/[facility]//[facility] Recovery Connection ([facility])  [facility] connects people seeking recovery to resources that help foster and sustain long-term recovery.  Whether you are seeking resources for treatment, transportation, housing, job training, education, health care or other pathways to recovery, [facility] is a great place to start.   Phone: [phone]. www.[place]Onovative.Voalte (Great listing of all types of  recovery and non-recovery related resources)

## 2020-03-18 NOTE — ED TRIAGE NOTES
Pt is intoxicated, arrived with an empty bottle of rum.  Pt stated she has COPD and does not have an inhaler, due to losing.  Pt exhibiting no signs of SOB.

## 2020-03-18 NOTE — ED NOTES
Emergency Department Patient Sign-out       Brief HPI and ED course:  Patient is a 69 year old female signed out to me by Dr. Serrano.  See initial ED Provider note for details of the presentation. In brief, patient with history of alcohol abuse and COPD, presenting with AMS and shortness of breath. Cardiopulmonary work-up unremarkable, given albuterol with improvement. AMS likely due to alcohol intoxication.     3/18/2020: 12:01 AM Ethanol g/dL 0.26 g/dL      Vitals:   Patient Vitals for the past 24 hrs:   BP Temp Temp src Pulse Resp SpO2 Height Weight   03/18/20 0640 120/68 97.7  F (36.5  C) Oral 89 18 96 % -- --   03/18/20 0500 -- -- -- -- -- 94 % -- --   03/18/20 0400 -- -- -- -- -- 93 % -- --   03/18/20 0233 -- -- -- 78 16 92 % -- --   03/18/20 0232 -- -- -- -- -- (!) 87 % -- --   03/18/20 0231 -- -- -- -- -- (!) 89 % -- --   03/18/20 0033 -- -- -- -- -- 92 % -- --   03/18/20 0032 -- -- -- -- -- 95 % -- --   03/18/20 0031 -- -- -- -- -- 94 % -- --   03/18/20 0030 -- -- -- -- -- 92 % -- --   03/18/20 0029 -- -- -- -- -- 94 % -- --   03/18/20 0028 -- -- -- -- -- 92 % -- --   03/18/20 0027 -- -- -- -- -- 91 % -- --   03/18/20 0026 -- -- -- -- -- 91 % -- --   03/18/20 0025 -- -- -- -- -- 90 % -- --   03/18/20 0024 -- -- -- -- -- 93 % -- --   03/18/20 0023 -- -- -- -- -- 93 % -- --   03/18/20 0022 -- -- -- -- -- 94 % -- --   03/18/20 0021 -- -- -- -- -- 92 % -- --   03/18/20 0020 -- -- -- -- -- 91 % -- --   03/18/20 0019 -- -- -- -- -- 94 % -- --   03/18/20 0018 -- -- -- -- -- 90 % -- --   03/18/20 0017 -- -- -- -- -- 91 % -- --   03/18/20 0016 -- -- -- -- -- 92 % -- --   03/18/20 0015 -- -- -- -- -- 95 % -- --   03/18/20 0014 -- -- -- -- -- 92 % -- --   03/18/20 0013 -- -- -- -- -- 92 % -- --   03/18/20 0012 -- -- -- -- -- 94 % -- --   03/18/20 0011 -- -- -- -- -- 91 % -- --   03/18/20 0010 -- -- -- -- -- 92 % -- --   03/18/20 0009 -- -- -- -- -- 93 % -- --   03/18/20 0008 -- -- -- -- -- 93 % -- --   03/18/20  "0007 -- -- -- -- -- 91 % -- --   03/18/20 0006 -- -- -- -- -- 92 % -- --   03/18/20 0005 -- -- -- -- -- 92 % -- --   03/18/20 0004 -- -- -- -- -- 92 % -- --   03/18/20 0003 -- -- -- -- -- 92 % -- --   03/18/20 0002 -- -- -- -- -- 92 % -- --   03/18/20 0001 -- -- -- -- -- 91 % -- --   03/18/20 0000 -- -- -- -- -- 92 % -- --   03/17/20 2359 -- -- -- -- -- 90 % -- --   03/17/20 2358 -- -- -- -- -- 91 % -- --   03/17/20 2357 -- -- -- -- -- 91 % -- --   03/17/20 2356 -- -- -- -- -- 92 % -- --   03/17/20 2355 -- -- -- -- -- (!) 89 % -- --   03/17/20 2354 -- -- -- -- -- 91 % -- --   03/17/20 2352 -- -- -- -- -- 90 % -- --   03/17/20 2351 -- -- -- -- -- 92 % -- --   03/17/20 2350 -- -- -- -- -- 90 % -- --   03/17/20 2349 -- -- -- -- -- (!) 89 % -- --   03/17/20 2348 -- -- -- -- -- 90 % -- --   03/17/20 2347 -- -- -- -- -- 93 % -- --   03/17/20 2346 -- -- -- -- -- 92 % -- --   03/17/20 2345 -- -- -- -- -- 91 % -- --   03/17/20 2344 -- -- -- -- -- 90 % -- --   03/17/20 2343 -- -- -- -- -- 93 % -- --   03/17/20 2333 -- -- -- -- -- 93 % -- --   03/17/20 2332 -- -- -- -- -- 92 % -- --   03/17/20 2331 -- -- -- -- -- 94 % -- --   03/17/20 2330 -- -- -- -- -- 94 % -- --   03/17/20 2329 -- -- -- -- -- 93 % -- --   03/17/20 2328 -- -- -- -- -- 95 % -- --   03/17/20 2326 -- -- -- -- -- 96 % -- --   03/17/20 2325 -- -- -- -- -- 97 % -- --   03/17/20 2324 -- -- -- -- -- 90 % -- --   03/17/20 2323 -- -- -- -- -- 93 % -- --   03/17/20 2322 -- -- -- -- -- 93 % -- --   03/17/20 2321 108/82 -- -- 85 -- -- -- --   03/17/20 2320 108/82 96.7  F (35.9  C) Tympanic 84 18 94 % 1.651 m (5' 5\") 54.1 kg (119 lb 4.8 oz)       Received Sign-out Plan:    Pending studies include: none      Plan:   -monitor for mental status clearing, likely discharge when sober with prescriptions (already written by Dr. Serrano)    Events after assuming care:  After care was assumed, a focused history and physical was performed. Agree with findings relayed by previous " provider. Lungs clear and work of breathing normal.     With monitoring, patient's mental status cleared. Patient then clinically sober with steady gait and tolerating PO. Normalization of mental status with sobering makes other causes of altered mental status very unlikely.     After counseling on the diagnosis, work-up, and treatment plan, the patient was discharged. Recommended safe cessation of intoxicating substances and provided information on community treatment resources. Patient to follow-up with primary care in the coming days for recheck and further cessation counseling. Patient to return to the ED if any urgent/life-threatening concerns.     Final diagnoses:   Alcohol intoxication delirium (H)   Altered mental status, unspecified altered mental status type   COPD exacerbation (H)     Discharge Medication List as of 3/18/2020  6:25 AM          --  Nirmal Sargent MD   Emergency Medicine   [facility] EMERGENCY DEPARTMENT  3/17/2020         Nirmal Sargent MD  03/18/20 0716

## 2020-07-29 NOTE — TELEPHONE ENCOUNTER
New Patient Oncology Nurse Navigator Note     Referring provider: Dr. Ramos     Referring Clinic/Organization: [facility]      Referred to: Surgical oncology     Requested provider (if applicable): Dr. Jovanny Roman    Referral Received: 07/29/20       Evaluation for : Appendix Ca - consult for HIPEC       [facility]     Records Needed:     - Outside imaging from [facility], all abdominal imaging dating back to 03/2020.     Additional testing needed prior to consult:     None.     Referral updates and Plan:       Consult with Surgical Oncology     07/29/2020 1:49 PM - called and left a message at patients home number for her to call back and discuss referral.       Ernestina Reed, RN, BSN   Surgical Oncology New Patient Nurse Navigator  [facility]  [phone]

## 2020-08-03 NOTE — TELEPHONE ENCOUNTER
I spoke with Lizeth and she said the patient wasn't available and she took our number and said she would give it to her.

## 2020-08-04 NOTE — TELEPHONE ENCOUNTER
She wasn't home and I gave the lady my number again to give to patient and she said that she already gave it to her but will give it to her again.

## 2020-08-07 NOTE — TELEPHONE ENCOUNTER
Action    Action Taken 8/7/20:     -[employer] Tracking ([facility], Path): [id number]  3:11 PM    8/10/20: Img from [facility] resolved. Updated CT Abd from [facility] )8/5/20 - report in CE) requested.  8:08 AM    -Imaging from [facility] resolved, and now viewable to PACS  1:33 PM     RECORDS STATUS - ALL OTHER DIAGNOSIS      RECORDS RECEIVED FROM: [facility] (Imaging previously resolved), [facility] (Imaging requested 8/7)   DATE RECEIVED:    NOTES STATUS DETAILS   OFFICE NOTE from referring provider DANNY Ramos   OFFICE NOTE from medical oncologist     DISCHARGE SUMMARY from hospital CE 5/12/20, 4/24/20, 4/14/20, 3/14/20   DISCHARGE REPORT from the ER     OPERATIVE REPORT CE 4/26/20   MEDICATION LIST     CLINICAL TRIAL TREATMENTS TO DATE     LABS     PATHOLOGY REPORTS [facility], Report in CE, Slides requested 8/7 7/8/20: [id number]  4/26/20: [id number]   ANYTHING RELATED TO DIAGNOSIS     GENONOMIC TESTING     TYPE: Epic 6/7/20   IMAGING (NEED IMAGES & REPORT)     CT SCANS Requested 8/7 3/14/20: [facility], Report in CE   MRI     MAMMO     ULTRASOUND     PET

## 2020-08-07 NOTE — TELEPHONE ENCOUNTER
ONCOLOGY INTAKE: Records Information      APPT INFORMATION:  Referring provider:  Dr. Kathy Ramos   Referring provider s clinic:  [facility]  Reason for visit/diagnosis: Signet ring cell carcinoma.  Has patient been notified of appointment date and time?: Yes    RECORDS INFORMATION:  Were the records received with the referral (via [employer])? Yes    Has patient been seen for any external appt for this diagnosis? Yes    If yes, where? [facility]    Has patient had any imaging or procedures outside of [place] for this condition? Yes      If Yes, where? [facility]    ADDITIONAL INFORMATION:  None

## 2020-08-11 NOTE — NURSING NOTE
"Oncology Rooming Note    August 11, 2020 9:52 AM   La Forrest is a 70 year old female who presents for:    Chief Complaint   Patient presents with     New Patient     SIGNET RING CELL CARCINOMA      Initial Vitals: /87   Pulse 87   Resp 18   Ht (P) 1.651 m (5' 5\")   Wt (P) 48 kg (105 lb 14.4 oz)   SpO2 97%   BMI (P) 17.62 kg/m   Estimated body mass index is 17.62 kg/m  (pended) as calculated from the following:    Height as of this encounter: (P) 1.651 m (5' 5\").    Weight as of this encounter: (P) 48 kg (105 lb 14.4 oz). Body surface area is 1.48 meters squared (pended).  Data Unavailable Comment: Data Unavailable   No LMP recorded. Patient is postmenopausal.  Allergies reviewed: Yes  Medications reviewed: Yes    Medications: Medication refills not needed today.  Pharmacy name entered into [facility]: [facility] - [place], [place] - [address]    Clinical concerns: New ptMarilyn Roman  was notified.      Savi Bridges            "

## 2020-08-11 NOTE — PROGRESS NOTES
HISTORY OF PRESENT ILLNESS:  La Forrest is a 70-year-old woman I was asked to see at the request of Dr. 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  In April, the patient developed abdominal pain and had a bowel obstruction.  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  There was positive disease.  There were positive nodules on her mesentery and she had positive peritoneal fluid.  She has not received any treatment since that time.  She had a PET CT scan on 05/15 which showed no evidence of peritoneal disease.  She had a CT scan on 07/02 which demonstrated dilated loops of bowel at the distal small bowel and a large amount of ascites.  She had a followup CT scan on 08/05 which showed a large amount of ascites and mass-like enhancement of her sigmoid colon.  She is now here to talk about treatment options, particularly hyperthermic intraperitoneal chemotherapy.  She has been having difficulty with bowel movements.  She has been having fluid in her abdomen and she is scheduled for a paracentesis tomorrow.      PAST MEDICAL HISTORY:  Significant for respiratory failure, alcohol abuse, COPD and smoking.      PHYSICAL EXAMINATION:     GENERAL:  She is a chronically ill woman who is thin, partially cachectic.     ABDOMEN:  She has a protuberant abdomen with a lot of ascites.      IMPRESSION:  Signet ring carcinomatosis.      PLAN:  I have told her that there is no role for cytoreductive surgery or intraperitoneal chemotherapy.  I did recommend that she see Dr. Ramos as soon as possible to start systemic therapy.      TT:  30 minutes.  CT:  20 minutes.       cc:     Kathy Ramos MD    [facility] [facility] [address]    [place] [address]

## 2020-08-11 NOTE — LETTER
8/11/2020         RE: La Forrest  [address] [address]        Dear Colleague,    Thank you for referring your patient, La Forrest, to the [facility]. Please see a copy of my visit note below.    HISTORY OF PRESENT ILLNESS:  La Forrest is a 70-year-old woman I was asked to see at the request of Dr. 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  In April, the patient developed abdominal pain and had a bowel obstruction.  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  There was positive disease.  There were positive nodules on her mesentery and she had positive peritoneal fluid.  She has not received any treatment since that time.  She had a PET CT scan on 05/15 which showed no evidence of peritoneal disease.  She had a CT scan on 07/02 which demonstrated dilated loops of bowel at the distal small bowel and a large amount of ascites.  She had a followup CT scan on 08/05 which showed a large amount of ascites and mass-like enhancement of her sigmoid colon.  She is now here to talk about treatment options, particularly hyperthermic intraperitoneal chemotherapy.  She has been having difficulty with bowel movements.  She has been having fluid in her abdomen and she is scheduled for a paracentesis tomorrow.      PAST MEDICAL HISTORY:  Significant for respiratory failure, alcohol abuse, COPD and smoking.      PHYSICAL EXAMINATION:     GENERAL:  She is a chronically ill woman who is thin, partially cachectic.     ABDOMEN:  She has a protuberant abdomen with a lot of ascites.      IMPRESSION:  Signet ring carcinomatosis.      PLAN:  I have told her that there is no role for cytoreductive surgery or intraperitoneal chemotherapy.  I did recommend that she see Dr. Ramos as soon as possible to start systemic therapy.      TT:  30 minutes.  CT:  20  minutes.       cc:     Kathy Ramos MD    [address], [address]    [place] [address]           Jovanny Roman MD

## 2020-08-12 NOTE — TELEPHONE ENCOUNTER
Action 08/12/20 3:32 PM - Janice   Action Taken  Pathology received from [facility] and sent to be reviewed with filled out pathology form.

## 2020-09-25 ENCOUNTER — TELEPHONE (OUTPATIENT)
Dept: ONCOLOGY | Facility: CLINIC | Age: 70
End: 2020-09-25

## 2020-09-25 NOTE — TELEPHONE ENCOUNTER
Tobacco Treatment Program at the [facility] attempted to reach Ms. Forrest on 2020 regarding the tobacco cessation program to help Ms. Forrest to quit smoking. .     [facility]  Tobacco Treatment Specialist  PH: [phone]    Was told patient is .

## 2020-09-29 ENCOUNTER — PATIENT OUTREACH (OUTPATIENT)
Dept: PALLIATIVE CARE | Facility: CLINIC | Age: 70
End: 2020-09-29

## 2020-09-29 NOTE — PROGRESS NOTES
NOTIFICATION OF A  PATIENT  EMAIL THIS COMPLETED INFORMATION TO THE APPROPRIATE ENTITY:    JEFFREY: [email or web address]   [facility]:  [email or web address]   RANGE: [email or web address]   UMP:  [email or web address]     PATIENT INFORMATION   Last name: Adriane First name: La   Medical Record Number: [id number] County of Death: [place]   YOB: 1950 Date of Death: 2020   PARENT (FOR PATIENTS UNDER 18) OR LEGAL GUARDIAN (IF APPLICABLE):   Relationship to  patient:   Last name: First name:   Date of Birth: Telephone Number:   Address:     City: State: Zip Code:   SURVIVING SPOUSE INFORMATION (IF THERE IS A SURVIVING SPOUSE)   Last name:  First name:   Date of Birth: Telephone number:   Address:     City: State: Zip Code:   PERSON THAT INFORMED US OF PATIENT'S DEATH   Last name:[facility] verified [place] [employer]    First name:   Relationship to  patient: Telephone number: